# Patient Record
Sex: FEMALE | Race: OTHER | HISPANIC OR LATINO
[De-identification: names, ages, dates, MRNs, and addresses within clinical notes are randomized per-mention and may not be internally consistent; named-entity substitution may affect disease eponyms.]

---

## 2023-04-05 ENCOUNTER — NON-APPOINTMENT (OUTPATIENT)
Age: 63
End: 2023-04-05

## 2023-04-05 ENCOUNTER — APPOINTMENT (OUTPATIENT)
Dept: FAMILY MEDICINE | Facility: CLINIC | Age: 63
End: 2023-04-05
Payer: OTHER GOVERNMENT

## 2023-04-05 VITALS
BODY MASS INDEX: 23.39 KG/M2 | DIASTOLIC BLOOD PRESSURE: 81 MMHG | HEART RATE: 74 BPM | RESPIRATION RATE: 16 BRPM | SYSTOLIC BLOOD PRESSURE: 128 MMHG | WEIGHT: 149 LBS | HEIGHT: 67 IN

## 2023-04-05 DIAGNOSIS — Z11.59 ENCOUNTER FOR SCREENING FOR OTHER VIRAL DISEASES: ICD-10-CM

## 2023-04-05 DIAGNOSIS — Z00.00 ENCOUNTER FOR GENERAL ADULT MEDICAL EXAMINATION W/OUT ABNORMAL FINDINGS: ICD-10-CM

## 2023-04-05 DIAGNOSIS — Z82.49 FAMILY HISTORY OF ISCHEMIC HEART DISEASE AND OTHER DISEASES OF THE CIRCULATORY SYSTEM: ICD-10-CM

## 2023-04-05 DIAGNOSIS — Z11.4 ENCOUNTER FOR SCREENING FOR HUMAN IMMUNODEFICIENCY VIRUS [HIV]: ICD-10-CM

## 2023-04-05 DIAGNOSIS — Z23 ENCOUNTER FOR IMMUNIZATION: ICD-10-CM

## 2023-04-05 DIAGNOSIS — Z12.11 ENCOUNTER FOR SCREENING FOR MALIGNANT NEOPLASM OF COLON: ICD-10-CM

## 2023-04-05 PROCEDURE — 99386 PREV VISIT NEW AGE 40-64: CPT | Mod: 25

## 2023-04-05 PROCEDURE — 90686 IIV4 VACC NO PRSV 0.5 ML IM: CPT

## 2023-04-05 PROCEDURE — 90471 IMMUNIZATION ADMIN: CPT

## 2023-04-05 PROCEDURE — G0008: CPT | Mod: 59

## 2023-04-05 PROCEDURE — 90715 TDAP VACCINE 7 YRS/> IM: CPT

## 2023-04-05 PROCEDURE — 36415 COLL VENOUS BLD VENIPUNCTURE: CPT

## 2023-04-05 RX ORDER — SODIUM BICARBONATE 650 MG/1
650 TABLET ORAL
Refills: 0 | Status: ACTIVE | COMMUNITY

## 2023-04-05 RX ORDER — OMEPRAZOLE 20 MG/1
20 CAPSULE, DELAYED RELEASE ORAL
Refills: 0 | Status: ACTIVE | COMMUNITY

## 2023-04-05 NOTE — HISTORY OF PRESENT ILLNESS
[FreeTextEntry1] : Establish care [de-identified] : Ms. 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 She will start immunotherapy in about 1 month.\par  \par Urology: BEENA Duenas ([facility] [place])\par Heme/onc: ROSA Sethi ([facility] [place])\par Hematologist: ORTIZ Gonzalez

## 2023-04-05 NOTE — HEALTH RISK ASSESSMENT
[No] : In the past 12 months have you used drugs other than those required for medical reasons? No [0] : 2) Feeling down, depressed, or hopeless: Not at all (0) [PHQ-2 Negative - No further assessment needed] : PHQ-2 Negative - No further assessment needed [Patient reported mammogram was normal] : Patient reported mammogram was normal [HIV Test offered] : HIV Test offered [Hepatitis C test offered] : Hepatitis C test offered [Former] : Former [0-4] : 0-4 [> 15 Years] : > 15 Years [[id number]] : 0 [MammogramDate] : 01/17 [PapSmearDate] : 01/21

## 2023-04-05 NOTE — PLAN
[FreeTextEntry1] : \par In regards annual physical exam: \par Advised to use sunscreen \par Influenza vaccine and Tdap vaccine given, tolerated well\par Ordering CBC, CMP, Hep C, HIV as per screening guidelines\par Depression screen: PHQ-2 test done, < 2 as noted above\par AUDIT-C test done, negative as noted above\par Advised to see optometrist once at year and dentist every 6 months\par Colon cancer screen ordered: iFOBT. had colonoscopy recently, will need records\par Referring to OB/Gyn for her PAP smear and mammogram\par \par Return to care: within 1 year for AWE or sooner should the need arise\par Call or return for any questions

## 2023-04-06 LAB
ALBUMIN SERPL ELPH-MCNC: 4.2 G/DL
ALP BLD-CCNC: 83 U/L
ALT SERPL-CCNC: 14 U/L
ANION GAP SERPL CALC-SCNC: 12 MMOL/L
AST SERPL-CCNC: 19 U/L
BASOPHILS # BLD AUTO: 0.02 K/UL
BASOPHILS NFR BLD AUTO: 0.4 %
BILIRUB SERPL-MCNC: 0.3 MG/DL
BUN SERPL-MCNC: 33 MG/DL
CALCIUM SERPL-MCNC: 9.7 MG/DL
CHLORIDE SERPL-SCNC: 104 MMOL/L
CO2 SERPL-SCNC: 22 MMOL/L
CREAT SERPL-MCNC: 0.9 MG/DL
EGFR: 73 ML/MIN/1.73M2
EOSINOPHIL # BLD AUTO: 0.15 K/UL
EOSINOPHIL NFR BLD AUTO: 3.1 %
GLUCOSE SERPL-MCNC: 94 MG/DL
HCT VFR BLD CALC: 33.6 %
HCV AB SER QL: NONREACTIVE
HCV S/CO RATIO: 0.22 S/CO
HGB BLD-MCNC: 10.5 G/DL
HIV1+2 AB SPEC QL IA.RAPID: NONREACTIVE
IMM GRANULOCYTES NFR BLD AUTO: 0.4 %
LYMPHOCYTES # BLD AUTO: 2.18 K/UL
LYMPHOCYTES NFR BLD AUTO: 44.9 %
MAN DIFF?: NORMAL
MCHC RBC-ENTMCNC: 29.7 PG
MCHC RBC-ENTMCNC: 31.3 GM/DL
MCV RBC AUTO: 94.9 FL
MONOCYTES # BLD AUTO: 0.43 K/UL
MONOCYTES NFR BLD AUTO: 8.8 %
NEUTROPHILS # BLD AUTO: 2.06 K/UL
NEUTROPHILS NFR BLD AUTO: 42.4 %
PLATELET # BLD AUTO: 274 K/UL
POTASSIUM SERPL-SCNC: 4.7 MMOL/L
PROT SERPL-MCNC: 7.4 G/DL
RBC # BLD: 3.54 M/UL
RBC # FLD: 16.1 %
SODIUM SERPL-SCNC: 139 MMOL/L
WBC # FLD AUTO: 4.86 K/UL

## 2023-04-20 ENCOUNTER — APPOINTMENT (OUTPATIENT)
Dept: FAMILY MEDICINE | Facility: CLINIC | Age: 63
End: 2023-04-20

## 2023-05-02 RX ORDER — APIXABAN 5 MG/1
5 TABLET, FILM COATED ORAL
Qty: 180 | Refills: 0 | Status: ACTIVE | COMMUNITY
Start: 1900-01-01 | End: 1900-01-01

## 2023-05-18 ENCOUNTER — APPOINTMENT (OUTPATIENT)
Dept: FAMILY MEDICINE | Facility: CLINIC | Age: 63
End: 2023-05-18
Payer: OTHER GOVERNMENT

## 2023-05-18 VITALS
SYSTOLIC BLOOD PRESSURE: 115 MMHG | HEART RATE: 64 BPM | DIASTOLIC BLOOD PRESSURE: 62 MMHG | WEIGHT: 149 LBS | RESPIRATION RATE: 16 BRPM | HEIGHT: 67 IN | BODY MASS INDEX: 23.39 KG/M2

## 2023-05-18 DIAGNOSIS — I10 ESSENTIAL (PRIMARY) HYPERTENSION: ICD-10-CM

## 2023-05-18 DIAGNOSIS — D64.9 ANEMIA, UNSPECIFIED: ICD-10-CM

## 2023-05-18 DIAGNOSIS — Z12.39 ENCOUNTER FOR OTHER SCREENING FOR MALIGNANT NEOPLASM OF BREAST: ICD-10-CM

## 2023-05-18 DIAGNOSIS — E78.5 HYPERLIPIDEMIA, UNSPECIFIED: ICD-10-CM

## 2023-05-18 DIAGNOSIS — R73.09 OTHER ABNORMAL GLUCOSE: ICD-10-CM

## 2023-05-18 PROCEDURE — 36415 COLL VENOUS BLD VENIPUNCTURE: CPT

## 2023-05-18 PROCEDURE — 99214 OFFICE O/P EST MOD 30 MIN: CPT | Mod: 25

## 2023-05-18 RX ORDER — METOPROLOL SUCCINATE 25 MG/1
25 TABLET, EXTENDED RELEASE ORAL
Qty: 90 | Refills: 0 | Status: DISCONTINUED | COMMUNITY
End: 2023-05-18

## 2023-05-18 NOTE — PLAN
[FreeTextEntry1] : \par In regards to hypertension\par Patient's blood pressure in adequate range. \par DIscussed avoid using salt, monitoring his blood pressures at home and bring the log on next visit.\par Not on metoprolol anymore\par Diet controlled\par On Sodium Bicarb by her urologist Dr Duenas\par Has her CMP monitored at [facility] [place], would like to defer it at this time\par \par In regards to hyperlipidemia and history of pre-DM\par Lifestyle modifications discussed\par Checking lipid panel\par Check an A1C. Report history of elevated glucoses at [facility] [place] [facility], will need records\par \par Anemia- monitored ad [facility]\par ASymptomatic\par \par Return to care: within 3 months for HTN or sooner should the need arise\par Call or return for any questions

## 2023-05-18 NOTE — HISTORY OF PRESENT ILLNESS
[FreeTextEntry1] : Follow up [de-identified] : Ms. FELIX PRICE is a pleasant 63 year old female with PMH of HTN, bladder cancer (jun/2022) s/p chemo last nov/2022, s/p neobladder 02/03/2023 never on radiation, HLD who comes in to the office for lab check. She would like her lipids and an A1C checked. Reports history of insulin resistance. No other complaints\par Has not been taking her metoprolol\par Her anemia is asymptomatic and monitored at [facility]\par \par I advised her to take iron\par She see urology who sends her her Sodium Bicarb \par  \par Urology: BEENA Duenas ([facility])\par Heme/onc: ROSA Sethi ([facility])\par Hematologist: ORTIZ Gonzalez

## 2023-05-19 LAB
CHOLEST SERPL-MCNC: 206 MG/DL
ESTIMATED AVERAGE GLUCOSE: 97 MG/DL
HBA1C MFR BLD HPLC: 5 %
HDLC SERPL-MCNC: 88 MG/DL
LDLC SERPL CALC-MCNC: 100 MG/DL
NONHDLC SERPL-MCNC: 117 MG/DL
TRIGL SERPL-MCNC: 88 MG/DL

## 2023-06-26 ENCOUNTER — APPOINTMENT (OUTPATIENT)
Dept: FAMILY MEDICINE | Facility: CLINIC | Age: 63
End: 2023-06-26

## 2023-07-06 ENCOUNTER — TRANSCRIPTION ENCOUNTER (OUTPATIENT)
Age: 63
End: 2023-07-06

## 2023-09-18 ENCOUNTER — NON-APPOINTMENT (OUTPATIENT)
Age: 63
End: 2023-09-18

## 2023-09-20 DIAGNOSIS — R92.8 OTHER ABNORMAL AND INCONCLUSIVE FINDINGS ON DIAGNOSTIC IMAGING OF BREAST: ICD-10-CM

## 2023-10-06 ENCOUNTER — NON-APPOINTMENT (OUTPATIENT)
Age: 63
End: 2023-10-06

## 2023-10-08 ENCOUNTER — NON-APPOINTMENT (OUTPATIENT)
Age: 63
End: 2023-10-08

## 2023-11-20 ENCOUNTER — INPATIENT (INPATIENT)
Facility: HOSPITAL | Age: 63
LOS: 1 days | Discharge: HOME | DRG: 393 | End: 2023-11-22
Attending: HOSPITALIST | Admitting: STUDENT IN AN ORGANIZED HEALTH CARE EDUCATION/TRAINING PROGRAM
Payer: OTHER GOVERNMENT

## 2023-11-20 VITALS
WEIGHT: 185.41 LBS | HEART RATE: 84 BPM | OXYGEN SATURATION: 98 % | SYSTOLIC BLOOD PRESSURE: 116 MMHG | DIASTOLIC BLOOD PRESSURE: 76 MMHG | TEMPERATURE: 98 F | RESPIRATION RATE: 17 BRPM

## 2023-11-20 LAB
ALBUMIN SERPL ELPH-MCNC: 4 G/DL — SIGNIFICANT CHANGE UP (ref 3.3–5.2)
ALBUMIN SERPL ELPH-MCNC: 4 G/DL — SIGNIFICANT CHANGE UP (ref 3.3–5.2)
ALP SERPL-CCNC: 53 U/L — SIGNIFICANT CHANGE UP (ref 40–120)
ALP SERPL-CCNC: 53 U/L — SIGNIFICANT CHANGE UP (ref 40–120)
ALT FLD-CCNC: 16 U/L — SIGNIFICANT CHANGE UP
ALT FLD-CCNC: 16 U/L — SIGNIFICANT CHANGE UP
ANION GAP SERPL CALC-SCNC: 8 MMOL/L — SIGNIFICANT CHANGE UP (ref 5–17)
ANION GAP SERPL CALC-SCNC: 8 MMOL/L — SIGNIFICANT CHANGE UP (ref 5–17)
AST SERPL-CCNC: 19 U/L — SIGNIFICANT CHANGE UP
AST SERPL-CCNC: 19 U/L — SIGNIFICANT CHANGE UP
BASOPHILS # BLD AUTO: 0.02 K/UL — SIGNIFICANT CHANGE UP (ref 0–0.2)
BASOPHILS # BLD AUTO: 0.02 K/UL — SIGNIFICANT CHANGE UP (ref 0–0.2)
BASOPHILS NFR BLD AUTO: 0.6 % — SIGNIFICANT CHANGE UP (ref 0–2)
BASOPHILS NFR BLD AUTO: 0.6 % — SIGNIFICANT CHANGE UP (ref 0–2)
BILIRUB SERPL-MCNC: <0.2 MG/DL — LOW (ref 0.4–2)
BILIRUB SERPL-MCNC: <0.2 MG/DL — LOW (ref 0.4–2)
BUN SERPL-MCNC: 35.8 MG/DL — HIGH (ref 8–20)
BUN SERPL-MCNC: 35.8 MG/DL — HIGH (ref 8–20)
CALCIUM SERPL-MCNC: 9.4 MG/DL — SIGNIFICANT CHANGE UP (ref 8.4–10.5)
CALCIUM SERPL-MCNC: 9.4 MG/DL — SIGNIFICANT CHANGE UP (ref 8.4–10.5)
CHLORIDE SERPL-SCNC: 108 MMOL/L — SIGNIFICANT CHANGE UP (ref 96–108)
CHLORIDE SERPL-SCNC: 108 MMOL/L — SIGNIFICANT CHANGE UP (ref 96–108)
CO2 SERPL-SCNC: 28 MMOL/L — SIGNIFICANT CHANGE UP (ref 22–29)
CO2 SERPL-SCNC: 28 MMOL/L — SIGNIFICANT CHANGE UP (ref 22–29)
CREAT SERPL-MCNC: 1.07 MG/DL — SIGNIFICANT CHANGE UP (ref 0.5–1.3)
CREAT SERPL-MCNC: 1.07 MG/DL — SIGNIFICANT CHANGE UP (ref 0.5–1.3)
EGFR: 59 ML/MIN/1.73M2 — LOW
EGFR: 59 ML/MIN/1.73M2 — LOW
EOSINOPHIL # BLD AUTO: 0.29 K/UL — SIGNIFICANT CHANGE UP (ref 0–0.5)
EOSINOPHIL # BLD AUTO: 0.29 K/UL — SIGNIFICANT CHANGE UP (ref 0–0.5)
EOSINOPHIL NFR BLD AUTO: 8 % — HIGH (ref 0–6)
EOSINOPHIL NFR BLD AUTO: 8 % — HIGH (ref 0–6)
GLUCOSE SERPL-MCNC: 103 MG/DL — HIGH (ref 70–99)
GLUCOSE SERPL-MCNC: 103 MG/DL — HIGH (ref 70–99)
HCT VFR BLD CALC: 31.3 % — LOW (ref 34.5–45)
HCT VFR BLD CALC: 31.3 % — LOW (ref 34.5–45)
HGB BLD-MCNC: 10.4 G/DL — LOW (ref 11.5–15.5)
HGB BLD-MCNC: 10.4 G/DL — LOW (ref 11.5–15.5)
IMM GRANULOCYTES NFR BLD AUTO: 0.3 % — SIGNIFICANT CHANGE UP (ref 0–0.9)
IMM GRANULOCYTES NFR BLD AUTO: 0.3 % — SIGNIFICANT CHANGE UP (ref 0–0.9)
INR BLD: 0.94 RATIO — SIGNIFICANT CHANGE UP (ref 0.85–1.18)
INR BLD: 0.94 RATIO — SIGNIFICANT CHANGE UP (ref 0.85–1.18)
LIDOCAIN IGE QN: 53 U/L — HIGH (ref 22–51)
LIDOCAIN IGE QN: 53 U/L — HIGH (ref 22–51)
LYMPHOCYTES # BLD AUTO: 1 K/UL — SIGNIFICANT CHANGE UP (ref 1–3.3)
LYMPHOCYTES # BLD AUTO: 1 K/UL — SIGNIFICANT CHANGE UP (ref 1–3.3)
LYMPHOCYTES # BLD AUTO: 27.5 % — SIGNIFICANT CHANGE UP (ref 13–44)
LYMPHOCYTES # BLD AUTO: 27.5 % — SIGNIFICANT CHANGE UP (ref 13–44)
MCHC RBC-ENTMCNC: 30.7 PG — SIGNIFICANT CHANGE UP (ref 27–34)
MCHC RBC-ENTMCNC: 30.7 PG — SIGNIFICANT CHANGE UP (ref 27–34)
MCHC RBC-ENTMCNC: 33.2 GM/DL — SIGNIFICANT CHANGE UP (ref 32–36)
MCHC RBC-ENTMCNC: 33.2 GM/DL — SIGNIFICANT CHANGE UP (ref 32–36)
MCV RBC AUTO: 92.3 FL — SIGNIFICANT CHANGE UP (ref 80–100)
MCV RBC AUTO: 92.3 FL — SIGNIFICANT CHANGE UP (ref 80–100)
MONOCYTES # BLD AUTO: 0.39 K/UL — SIGNIFICANT CHANGE UP (ref 0–0.9)
MONOCYTES # BLD AUTO: 0.39 K/UL — SIGNIFICANT CHANGE UP (ref 0–0.9)
MONOCYTES NFR BLD AUTO: 10.7 % — SIGNIFICANT CHANGE UP (ref 2–14)
MONOCYTES NFR BLD AUTO: 10.7 % — SIGNIFICANT CHANGE UP (ref 2–14)
NEUTROPHILS # BLD AUTO: 1.92 K/UL — SIGNIFICANT CHANGE UP (ref 1.8–7.4)
NEUTROPHILS # BLD AUTO: 1.92 K/UL — SIGNIFICANT CHANGE UP (ref 1.8–7.4)
NEUTROPHILS NFR BLD AUTO: 52.9 % — SIGNIFICANT CHANGE UP (ref 43–77)
NEUTROPHILS NFR BLD AUTO: 52.9 % — SIGNIFICANT CHANGE UP (ref 43–77)
PLATELET # BLD AUTO: 144 K/UL — LOW (ref 150–400)
PLATELET # BLD AUTO: 144 K/UL — LOW (ref 150–400)
POTASSIUM SERPL-MCNC: 4.6 MMOL/L — SIGNIFICANT CHANGE UP (ref 3.5–5.3)
POTASSIUM SERPL-MCNC: 4.6 MMOL/L — SIGNIFICANT CHANGE UP (ref 3.5–5.3)
POTASSIUM SERPL-SCNC: 4.6 MMOL/L — SIGNIFICANT CHANGE UP (ref 3.5–5.3)
POTASSIUM SERPL-SCNC: 4.6 MMOL/L — SIGNIFICANT CHANGE UP (ref 3.5–5.3)
PROT SERPL-MCNC: 6.1 G/DL — LOW (ref 6.6–8.7)
PROT SERPL-MCNC: 6.1 G/DL — LOW (ref 6.6–8.7)
PROTHROM AB SERPL-ACNC: 10.5 SEC — SIGNIFICANT CHANGE UP (ref 9.5–13)
PROTHROM AB SERPL-ACNC: 10.5 SEC — SIGNIFICANT CHANGE UP (ref 9.5–13)
RBC # BLD: 3.39 M/UL — LOW (ref 3.8–5.2)
RBC # BLD: 3.39 M/UL — LOW (ref 3.8–5.2)
RBC # FLD: 12.4 % — SIGNIFICANT CHANGE UP (ref 10.3–14.5)
RBC # FLD: 12.4 % — SIGNIFICANT CHANGE UP (ref 10.3–14.5)
SODIUM SERPL-SCNC: 144 MMOL/L — SIGNIFICANT CHANGE UP (ref 135–145)
SODIUM SERPL-SCNC: 144 MMOL/L — SIGNIFICANT CHANGE UP (ref 135–145)
WBC # BLD: 3.63 K/UL — LOW (ref 3.8–10.5)
WBC # BLD: 3.63 K/UL — LOW (ref 3.8–10.5)
WBC # FLD AUTO: 3.63 K/UL — LOW (ref 3.8–10.5)
WBC # FLD AUTO: 3.63 K/UL — LOW (ref 3.8–10.5)

## 2023-11-20 PROCEDURE — 99285 EMERGENCY DEPT VISIT HI MDM: CPT

## 2023-11-20 NOTE — ED PROVIDER NOTE - ATTENDING CONTRIBUTION TO CARE
63y female w/ pmh of bladder cancer, neobladder procedure in 2/23 and subsequent PE now on Eliquis sent by her hematologist for bright red blood per rectum. Patient states she has noticed some bright red blood when wiping after bowel movements over the last couple of days.  However she states over the last 24 hours she notices significant increase in amount of bleeding when having bowel movements; she has had blood with wiping, blood in the bowl, and blood mixed with stool.  Patient however denies any additional symptoms at this time      I personally saw the patient with the resident, and completed the key components of the history and physical exam. I then discussed the management plan with the resident.

## 2023-11-20 NOTE — ED ADULT TRIAGE NOTE - GLASGOW COMA SCALE: EYE OPENING, MLM
(E4) spontaneous Detail Level: Detailed Introduction Text (Please End With A Colon): The following procedure was deferred:

## 2023-11-20 NOTE — ED PROVIDER NOTE - NS ED ROS FT
General: Denies fever, chills  HEENT: Denies sore throat  Neck: Denies neck pain  Resp: Denies coughing, SOB  Cardiovascular: Denies CP, palpitations, LE edema  GI: Denies nausea, vomiting, abdominal pain, diarrhea, constipation. rectal bleeding  : Denies dysuria, hematuria  MSK: Denies back pain  Neuro: Denies HA, dizziness, numbness, weakness  Skin: Denies rashes.

## 2023-11-20 NOTE — ED PROVIDER NOTE - CLINICAL SUMMARY MEDICAL DECISION MAKING FREE TEXT BOX
63y female w/ pmh of bladder cancer, bladder reconstruction, and PE evaluated for bright red blood per rectum. 63y female w/ pmh of bladder cancer, bladder reconstruction, and PE evaluated for bright red blood per rectum. Patient well appearing on exam, vitals stable, CBC from earlier today shows hemoglobin of 11.2. 63y female w/ pmh of bladder cancer, bladder reconstruction, and PE evaluated for bright red blood per rectum. Patient well appearing on exam, vitals stable, CBC from earlier today shows hemoglobin of 11.2. repeat CBC in the ED shows Hgb of 10.4, almost a unit drop in one day. Patient continues to have bloody BMs while in the ED. Dr. Rdoríguez to admit for colonoscopy and further monitoring.

## 2023-11-20 NOTE — ED ADULT TRIAGE NOTE - CHIEF COMPLAINT QUOTE
pt presents c/o bright red rectal bleeding starting 1am, [place] [facility] on eliquis, denies dizziness/lightheadedness, abd pain

## 2023-11-20 NOTE — ED PROVIDER NOTE - PROGRESS NOTE DETAILS
Patient has had several bowel movements in the ER and states that she continues to have a small amount of bleeding but it continues to be more significant than it has in the past;   She states blood seems to be mixed into the bowl when she has bowel movements.  Patient denies clots or profuse bleeding.

## 2023-11-20 NOTE — ED PROVIDER NOTE - OBJECTIVE STATEMENT
63y female w/ pmh of bladder cancer, neobladder procedure in 2/23 and subsequent PE now on Eliquis sent by her hematologist for bright red blood per rectum. She states she first noticed some spots on the toilet paper on 11/9/23, today she noticed a moderate amount of bright red blood in the toilet bowl. She denies any abdominal or rectal pain, fever, N/V/D, vaginal bleeding, blood in her urine, fatigue, SOB, lightheadedness, palpitations, chest pain. She had a colonoscopy in January of this year which showed grade I internal hemorrhoids but was otherwise clear. She is on immunotherapy for her cancer. No other complaints noted.

## 2023-11-21 DIAGNOSIS — Z90.6 ACQUIRED ABSENCE OF OTHER PARTS OF URINARY TRACT: Chronic | ICD-10-CM

## 2023-11-21 DIAGNOSIS — K92.2 GASTROINTESTINAL HEMORRHAGE, UNSPECIFIED: ICD-10-CM

## 2023-11-21 DIAGNOSIS — Z90.710 ACQUIRED ABSENCE OF BOTH CERVIX AND UTERUS: Chronic | ICD-10-CM

## 2023-11-21 LAB
ANION GAP SERPL CALC-SCNC: 10 MMOL/L — SIGNIFICANT CHANGE UP (ref 5–17)
ANION GAP SERPL CALC-SCNC: 10 MMOL/L — SIGNIFICANT CHANGE UP (ref 5–17)
BASOPHILS # BLD AUTO: 0.01 K/UL — SIGNIFICANT CHANGE UP (ref 0–0.2)
BASOPHILS # BLD AUTO: 0.01 K/UL — SIGNIFICANT CHANGE UP (ref 0–0.2)
BASOPHILS NFR BLD AUTO: 0.3 % — SIGNIFICANT CHANGE UP (ref 0–2)
BASOPHILS NFR BLD AUTO: 0.3 % — SIGNIFICANT CHANGE UP (ref 0–2)
BLD GP AB SCN SERPL QL: SIGNIFICANT CHANGE UP
BLD GP AB SCN SERPL QL: SIGNIFICANT CHANGE UP
BUN SERPL-MCNC: 32.9 MG/DL — HIGH (ref 8–20)
BUN SERPL-MCNC: 32.9 MG/DL — HIGH (ref 8–20)
CALCIUM SERPL-MCNC: 8.9 MG/DL — SIGNIFICANT CHANGE UP (ref 8.4–10.5)
CALCIUM SERPL-MCNC: 8.9 MG/DL — SIGNIFICANT CHANGE UP (ref 8.4–10.5)
CHLORIDE SERPL-SCNC: 107 MMOL/L — SIGNIFICANT CHANGE UP (ref 96–108)
CHLORIDE SERPL-SCNC: 107 MMOL/L — SIGNIFICANT CHANGE UP (ref 96–108)
CO2 SERPL-SCNC: 25 MMOL/L — SIGNIFICANT CHANGE UP (ref 22–29)
CO2 SERPL-SCNC: 25 MMOL/L — SIGNIFICANT CHANGE UP (ref 22–29)
CREAT SERPL-MCNC: 0.8 MG/DL — SIGNIFICANT CHANGE UP (ref 0.5–1.3)
CREAT SERPL-MCNC: 0.8 MG/DL — SIGNIFICANT CHANGE UP (ref 0.5–1.3)
EGFR: 83 ML/MIN/1.73M2 — SIGNIFICANT CHANGE UP
EGFR: 83 ML/MIN/1.73M2 — SIGNIFICANT CHANGE UP
EOSINOPHIL # BLD AUTO: 0.25 K/UL — SIGNIFICANT CHANGE UP (ref 0–0.5)
EOSINOPHIL # BLD AUTO: 0.25 K/UL — SIGNIFICANT CHANGE UP (ref 0–0.5)
EOSINOPHIL NFR BLD AUTO: 7.4 % — HIGH (ref 0–6)
EOSINOPHIL NFR BLD AUTO: 7.4 % — HIGH (ref 0–6)
GLUCOSE SERPL-MCNC: 105 MG/DL — HIGH (ref 70–99)
GLUCOSE SERPL-MCNC: 105 MG/DL — HIGH (ref 70–99)
HCT VFR BLD CALC: 31.3 % — LOW (ref 34.5–45)
HCT VFR BLD CALC: 31.3 % — LOW (ref 34.5–45)
HGB BLD-MCNC: 10.3 G/DL — LOW (ref 11.5–15.5)
HGB BLD-MCNC: 10.3 G/DL — LOW (ref 11.5–15.5)
IMM GRANULOCYTES NFR BLD AUTO: 0 % — SIGNIFICANT CHANGE UP (ref 0–0.9)
IMM GRANULOCYTES NFR BLD AUTO: 0 % — SIGNIFICANT CHANGE UP (ref 0–0.9)
LYMPHOCYTES # BLD AUTO: 0.88 K/UL — LOW (ref 1–3.3)
LYMPHOCYTES # BLD AUTO: 0.88 K/UL — LOW (ref 1–3.3)
LYMPHOCYTES # BLD AUTO: 26.2 % — SIGNIFICANT CHANGE UP (ref 13–44)
LYMPHOCYTES # BLD AUTO: 26.2 % — SIGNIFICANT CHANGE UP (ref 13–44)
MCHC RBC-ENTMCNC: 30.5 PG — SIGNIFICANT CHANGE UP (ref 27–34)
MCHC RBC-ENTMCNC: 30.5 PG — SIGNIFICANT CHANGE UP (ref 27–34)
MCHC RBC-ENTMCNC: 32.9 GM/DL — SIGNIFICANT CHANGE UP (ref 32–36)
MCHC RBC-ENTMCNC: 32.9 GM/DL — SIGNIFICANT CHANGE UP (ref 32–36)
MCV RBC AUTO: 92.6 FL — SIGNIFICANT CHANGE UP (ref 80–100)
MCV RBC AUTO: 92.6 FL — SIGNIFICANT CHANGE UP (ref 80–100)
MONOCYTES # BLD AUTO: 0.39 K/UL — SIGNIFICANT CHANGE UP (ref 0–0.9)
MONOCYTES # BLD AUTO: 0.39 K/UL — SIGNIFICANT CHANGE UP (ref 0–0.9)
MONOCYTES NFR BLD AUTO: 11.6 % — SIGNIFICANT CHANGE UP (ref 2–14)
MONOCYTES NFR BLD AUTO: 11.6 % — SIGNIFICANT CHANGE UP (ref 2–14)
NEUTROPHILS # BLD AUTO: 1.83 K/UL — SIGNIFICANT CHANGE UP (ref 1.8–7.4)
NEUTROPHILS # BLD AUTO: 1.83 K/UL — SIGNIFICANT CHANGE UP (ref 1.8–7.4)
NEUTROPHILS NFR BLD AUTO: 54.5 % — SIGNIFICANT CHANGE UP (ref 43–77)
NEUTROPHILS NFR BLD AUTO: 54.5 % — SIGNIFICANT CHANGE UP (ref 43–77)
PLATELET # BLD AUTO: 129 K/UL — LOW (ref 150–400)
PLATELET # BLD AUTO: 129 K/UL — LOW (ref 150–400)
POTASSIUM SERPL-MCNC: 4 MMOL/L — SIGNIFICANT CHANGE UP (ref 3.5–5.3)
POTASSIUM SERPL-MCNC: 4 MMOL/L — SIGNIFICANT CHANGE UP (ref 3.5–5.3)
POTASSIUM SERPL-SCNC: 4 MMOL/L — SIGNIFICANT CHANGE UP (ref 3.5–5.3)
POTASSIUM SERPL-SCNC: 4 MMOL/L — SIGNIFICANT CHANGE UP (ref 3.5–5.3)
RBC # BLD: 3.38 M/UL — LOW (ref 3.8–5.2)
RBC # BLD: 3.38 M/UL — LOW (ref 3.8–5.2)
RBC # FLD: 12.4 % — SIGNIFICANT CHANGE UP (ref 10.3–14.5)
RBC # FLD: 12.4 % — SIGNIFICANT CHANGE UP (ref 10.3–14.5)
SODIUM SERPL-SCNC: 142 MMOL/L — SIGNIFICANT CHANGE UP (ref 135–145)
SODIUM SERPL-SCNC: 142 MMOL/L — SIGNIFICANT CHANGE UP (ref 135–145)
WBC # BLD: 3.36 K/UL — LOW (ref 3.8–10.5)
WBC # BLD: 3.36 K/UL — LOW (ref 3.8–10.5)
WBC # FLD AUTO: 3.36 K/UL — LOW (ref 3.8–10.5)
WBC # FLD AUTO: 3.36 K/UL — LOW (ref 3.8–10.5)

## 2023-11-21 PROCEDURE — 99497 ADVNCD CARE PLAN 30 MIN: CPT | Mod: 25

## 2023-11-21 PROCEDURE — 99223 1ST HOSP IP/OBS HIGH 75: CPT

## 2023-11-21 PROCEDURE — 99222 1ST HOSP IP/OBS MODERATE 55: CPT | Mod: GC

## 2023-11-21 RX ORDER — ACETAMINOPHEN 500 MG
650 TABLET ORAL EVERY 6 HOURS
Refills: 0 | Status: DISCONTINUED | OUTPATIENT
Start: 2023-11-21 | End: 2023-11-22

## 2023-11-21 RX ORDER — APIXABAN 2.5 MG/1
1 TABLET, FILM COATED ORAL
Refills: 0 | DISCHARGE

## 2023-11-21 RX ORDER — ONDANSETRON 8 MG/1
4 TABLET, FILM COATED ORAL EVERY 8 HOURS
Refills: 0 | Status: DISCONTINUED | OUTPATIENT
Start: 2023-11-21 | End: 2023-11-22

## 2023-11-21 RX ORDER — NIVOLUMAB 10 MG/ML
240 INJECTION INTRAVENOUS
Refills: 0 | DISCHARGE

## 2023-11-21 RX ORDER — POLYETHYLENE GLYCOL 3350 17 G/17G
17 POWDER, FOR SOLUTION ORAL DAILY
Refills: 0 | Status: DISCONTINUED | OUTPATIENT
Start: 2023-11-21 | End: 2023-11-22

## 2023-11-21 RX ORDER — APIXABAN 2.5 MG/1
5 TABLET, FILM COATED ORAL
Refills: 0 | Status: DISCONTINUED | OUTPATIENT
Start: 2023-11-21 | End: 2023-11-22

## 2023-11-21 RX ORDER — DIPHENHYDRAMINE HCL 50 MG
25 CAPSULE ORAL ONCE
Refills: 0 | Status: COMPLETED | OUTPATIENT
Start: 2023-11-21 | End: 2023-11-21

## 2023-11-21 RX ORDER — PANTOPRAZOLE SODIUM 20 MG/1
40 TABLET, DELAYED RELEASE ORAL
Refills: 0 | Status: DISCONTINUED | OUTPATIENT
Start: 2023-11-21 | End: 2023-11-22

## 2023-11-21 RX ORDER — PANTOPRAZOLE SODIUM 20 MG/1
40 TABLET, DELAYED RELEASE ORAL
Refills: 0 | Status: DISCONTINUED | OUTPATIENT
Start: 2023-11-21 | End: 2023-11-21

## 2023-11-21 RX ORDER — HYDROCORTISONE 1 %
1 OINTMENT (GRAM) TOPICAL
Refills: 0 | Status: DISCONTINUED | OUTPATIENT
Start: 2023-11-21 | End: 2023-11-22

## 2023-11-21 RX ORDER — LANOLIN ALCOHOL/MO/W.PET/CERES
3 CREAM (GRAM) TOPICAL AT BEDTIME
Refills: 0 | Status: DISCONTINUED | OUTPATIENT
Start: 2023-11-21 | End: 2023-11-22

## 2023-11-21 RX ORDER — SENNA PLUS 8.6 MG/1
2 TABLET ORAL AT BEDTIME
Refills: 0 | Status: DISCONTINUED | OUTPATIENT
Start: 2023-11-21 | End: 2023-11-22

## 2023-11-21 RX ADMIN — Medication 25 MILLIGRAM(S): at 18:04

## 2023-11-21 RX ADMIN — PANTOPRAZOLE SODIUM 40 MILLIGRAM(S): 20 TABLET, DELAYED RELEASE ORAL at 18:27

## 2023-11-21 RX ADMIN — Medication 25 MILLIGRAM(S): at 02:47

## 2023-11-21 RX ADMIN — PANTOPRAZOLE SODIUM 40 MILLIGRAM(S): 20 TABLET, DELAYED RELEASE ORAL at 02:04

## 2023-11-21 NOTE — CONSULT NOTE ADULT - ATTENDING COMMENTS
Ms. Velez is a 63 year old woman with significant past medical history of bladder cancer s/p resection with neobladder formation further complicated by pulmonary embolism on Apixaban who presented to the ED with multiple episodes of bright red blood per rectum for which GI consulted. She recently underwent Colonoscopy 1/2023 (at [facility] [place]) noted to have internal hemorrhoids. She admits that due to difficulty with micturition she spends long periods of time (upwards of 30-40 minutes) on the toilet and increased intra-abdominal pressure which likely has exacerbated her hemorrhoids. Discussed with patient at bedside regarding avoidance however may be difficult given concerns for inability to adequate void. Overall hemoglobin in ED remains stable (10.3 / 10.4), no further episodes of bleeding. Majority of lower GI bleeding is self limiting. Rectal examination with evidence of brown colored stool rectal vault. Imaging and labs reviewed.     Internal hemorrhoids   Intermittent bright red blood per rectum - resolved     Plan:   - No urgent or emergent endoscopic intervention planned at this time.   - Encouraged avoidance of prolonged toilet use.   - Recommended Miralax / stool softeners.   - Can use Anusol PRN.   - Would follow up outpatient with her GI for further management.   Please do not hesitate to contact GI for further questions or concerns not previously addressed.

## 2023-11-21 NOTE — H&P ADULT - ASSESSMENT
62 yo female with pmhx Bladder CA s/p cystectomy/neobladder procedure with subsequent PE on Eliquis presenting to the ED with complaint of BRBPR.     Acute Blood Loss Anemia  -Admit to medicine  -Hgb stable 11.2 -> 10.4  -Start Protonix 40 mg IV BID  -Keep NPO pending GI evaluation  -Hold Eliquis   -Monitor CBC Q6  -Transfuse for Hgb < 7.0; patient consented and type and screen done    Hx of PE  -Hold Eliquis for now    Bladder Cancer  -On immunotherapy Q1 month    DVTppx: SCDs, hold chemical DVTppx due to GI bleed  GOC: 62 yo female with pmhx Bladder CA s/p cystectomy/neobladder procedure with subsequent PE on Eliquis presenting to the ED with complaint of BRBPR.     Acute Blood Loss Anemia  -Admit to medicine  -Hgb stable 11.2 -> 10.4  -Start Protonix 40 mg IV BID  -Keep NPO pending GI evaluation  -Hold Eliquis   -Monitor CBC Q6  -Transfuse for Hgb < 7.0; patient consented and type and screen done    Hx of PE  -Hold Eliquis for now    Bladder Cancer  -On immunotherapy Q1 month    DVTppx: SCDs, hold chemical DVTppx due to GI bleed  GOC:  Full Code

## 2023-11-21 NOTE — PROVIDER CONTACT NOTE (OTHER) - REASON
Pt c/o itching to buttocks, pt has rash from immunotherapy treatment requesting benadryl 2/2 itchiness

## 2023-11-21 NOTE — ED ADULT NURSE NOTE - NSFALLUNIVINTERV_ED_ALL_ED
Bed/Stretcher in lowest position, wheels locked, appropriate side rails in place/Call bell, personal items and telephone in reach/Instruct patient to call for assistance before getting out of bed/chair/stretcher/Non-slip footwear applied when patient is off stretcher/[place] to call system/Physically safe environment - no spills, clutter or unnecessary equipment/Purposeful proactive rounding/Room/bathroom lighting operational, light cord in reach

## 2023-11-21 NOTE — H&P ADULT - NSHPPHYSICALEXAM_GEN_ALL_CORE
Vital Signs Last 24 Hrs  T(C): 36.5 (20 Nov 2023 23:52), Max: 36.8 (20 Nov 2023 20:22)  T(F): 97.7 (20 Nov 2023 23:52), Max: 98.2 (20 Nov 2023 20:22)  HR: 73 (20 Nov 2023 23:52) (73 - 84)  BP: 101/67 (20 Nov 2023 23:52) (101/67 - 116/76)  BP(mean): --  RR: 17 (20 Nov 2023 23:52) (17 - 17)  SpO2: 99% (20 Nov 2023 23:52) (98% - 99%)    Parameters below as of 20 Nov 2023 23:52  Patient On (Oxygen Delivery Method): room air        General: Age-appearing, in no acute distress  Head: Normocephalic, atraumatic  ENMT: EOMI, neck supple  Cardiovascular: +S1, S2; Regular rate and rhythm, no murmurs, rubs, gallops  Respiratory: CTA BL, no wheezes, rales, rhonchi  Gastrointestinal: Abdomen soft, non-tender, +BS in all 4 quadrants  Extremities: No clubbing, cyanosis, or edema  Vascular: 2+ pulses, cap refill < 2 seconds  Neuro: Non-focal, AAOx4, sensation intact BL  Musculoskeletal: Normal tone, no deformities  Skin: Warm, dry; no acute rash seen  Psych: Appropriate, cooperative Vital Signs Last 24 Hrs  T(C): 36.5 (20 Nov 2023 23:52), Max: 36.8 (20 Nov 2023 20:22)  T(F): 97.7 (20 Nov 2023 23:52), Max: 98.2 (20 Nov 2023 20:22)  HR: 73 (20 Nov 2023 23:52) (73 - 84)  BP: 101/67 (20 Nov 2023 23:52) (101/67 - 116/76)  BP(mean): --  RR: 17 (20 Nov 2023 23:52) (17 - 17)  SpO2: 99% (20 Nov 2023 23:52) (98% - 99%)    Parameters below as of 20 Nov 2023 23:52  Patient On (Oxygen Delivery Method): room air    General: Age-appearing, in no acute distress  Head: Normocephalic, atraumatic  ENMT: EOMI, neck supple  Cardiovascular: +S1, S2; Regular rate and rhythm, no murmurs, rubs, gallops  Respiratory: CTA BL, no wheezes, rales, rhonchi  Gastrointestinal: Abdomen soft, non-tender, +BS in all 4 quadrants  Extremities: No clubbing, cyanosis, or edema  Vascular: 2+ pulses, cap refill < 2 seconds  Neuro: Non-focal, AAOx4, sensation intact BL  Musculoskeletal: Normal tone, no deformities  Skin: Warm, dry; no acute rash seen  Psych: Appropriate, cooperative

## 2023-11-21 NOTE — H&P ADULT - TIME BILLING
Time spent on patient encounter including emergency department chart review, history taking and physical exam, developing patient plan and tailoring H&P, as well as discussion with patient, family, RN, and other providers involved in patient's care.

## 2023-11-21 NOTE — CONSULT NOTE ADULT - ASSESSMENT
64 yo female with pmhx Bladder CA s/p cystectomy/neobladder procedure (on opdivo q4 weeks) with subsequent PE on Eliquis presenting to the ED with CC of BRBPR. Colonoscopy performed 1/23 at [facility] [place] with just hemorrhoids. Hb stable at 10.3 Brown stool in rectal vault. BRBPR likely in setting of hemorrhoids    Hemorrhoidal bleed:  -No indication for endoscopic intervention at this time as per GI  -follow up GI recs, recommend Anusok HC suppository or ointment   - monitor Hb (latest Hb 10.3)    Hx of bladder cancer:   - on Opdivo on outpt basis, to be held during inpatient stay  - follows up with [facility] and will update daily  - has groin itching rash, has outpatient appointment with dermatology, to keep appointment with derm upon d/c    Will follow 64 yo female with pmhx Bladder CA s/p cystectomy/neobladder procedure (on opdivo q4 weeks) with subsequent PE on Eliquis presenting to the ED with CC of BRBPR. [place] [facility] [place]. Hb stable at 10.3 Brown stool in rectal vault. BRBPR likely in setting of hemorrhoids    Hemorrhoidal bleed:  -No indication for endoscopic intervention at this time as per GI  -follow up GI recs, recommend Anusok HC suppository or ointment   - monitor Hb (latest Hb 10.3)    Hx of bladder cancer:   - on Opdivo on outpt basis, to be held during inpatient stay  - [place] [facility] [place] will [place] daily  - has groin itching rash, has outpatient appointment with dermatology, to keep appointment with derm upon d/c.     History of PE:  - was diagnosed with PE in postsurgical setting in Feb 2023 and has been on ELiquis since then  - if no active bleeding with stable Hb and if cleared from GI standpoint, Eliquis can be resumed with close monitoring of CBC    Will follow n/a

## 2023-11-21 NOTE — H&P ADULT - NSICDXPASTSURGICALHX_GEN_ALL_CORE_FT
PAST SURGICAL HISTORY:  History of total cystectomy      PAST SURGICAL HISTORY:  History of total cystectomy     S/P hysterectomy with oophorectomy

## 2023-11-21 NOTE — H&P ADULT - NSHPLABSRESULTS_GEN_ALL_CORE
10.4   3.63  )-----------( 144      ( 20 Nov 2023 23:05 )             31.3     20 Nov 2023 23:05    144    |  108    |  35.8   ----------------------------<  103    4.6     |  28.0   |  1.07     Ca    9.4        20 Nov 2023 23:05    TPro  6.1    /  Alb  4.0    /  TBili  <0.2   /  DBili  x      /  AST  19     /  ALT  16     /  AlkPhos  53     20 Nov 2023 23:05    PT/INR - ( 20 Nov 2023 23:05 )   PT: 10.5 sec;   INR: 0.94 ratio           CAPILLARY BLOOD GLUCOSE        LIVER FUNCTIONS - ( 20 Nov 2023 23:05 )  Alb: 4.0 g/dL / Pro: 6.1 g/dL / ALK PHOS: 53 U/L / ALT: 16 U/L / AST: 19 U/L / GGT: x           Urinalysis Basic - ( 20 Nov 2023 23:05 )    Color: x / Appearance: x / SG: x / pH: x  Gluc: 103 mg/dL / Ketone: x  / Bili: x / Urobili: x   Blood: x / Protein: x / Nitrite: x   Leuk Esterase: x / RBC: x / WBC x   Sq Epi: x / Non Sq Epi: x / Bacteria: x

## 2023-11-21 NOTE — H&P ADULT - HISTORY OF PRESENT ILLNESS
64 yo female with pmhx Bladder CA s/p cystectomy/neobladder procedure with subsequent PE on Eliquis presenting to the ED with complaint of BRBPR. This initially started 10 days ago withg small spots seen on the toilet paper, but today she noted a moderate amount of blood in the toilet bowl. Patient was seen by [facility] today and had CBC drawn with Hgb 11.2, repeat here was 10.4. She denies symptoms of lightheadedness, dizziness, fatigue, syncope. No hematemesis, hematuria, hemoptysis  62 yo female with pmhx Bladder CA s/p cystectomy/neobladder procedure with subsequent PE on Eliquis presenting to the ED with complaint of BRBPR. This initially started 10 days ago with small spots seen on the toilet paper, but today she noted a large amount of blood in the toilet bowl. She has 5 episodes of bloody stool in the past 24 hours. Patient was seen by [facility] today and had CBC drawn with Hgb 11.2, repeat here was 10.4. She denies symptoms of lightheadedness, dizziness, fatigue, syncope. No hematemesis, hematuria, hemoptysis

## 2023-11-21 NOTE — CONSULT NOTE ADULT - ASSESSMENT
INCOMPLETE    62 yo female with pmhx Bladder CA s/p cystectomy/neobladder procedure (on opdivo q4 weeks) with subsequent PE on Eliquis presenting to the ED with CC of BRBPR. Colonoscopy performed 1/23 at [facility] [place] with just hemorrhoids. Hb stable at 10.3 Brown stool in rectal vault. BRBPR likely in setting of hemorrhoids  -No indication for endoscopic intervention at this time  -can discharge patient on Anusol HC suppository or ointment     64 yo female with pmhx Bladder CA s/p cystectomy/neobladder procedure (on opdivo q4 weeks) with subsequent PE on Eliquis presenting to the ED with CC of BRBPR. Colonoscopy performed 1/23 at [facility] [place] with just hemorrhoids. Hb stable at 10.3 Brown stool in rectal vault. BRBPR likely in setting of hemorrhoids.    Plan:   - No emergent indication for endoscopic intervention at this time  - Can discharge patient on Anusol HC suppository or ointment  See attending attestation below for additional recommendations.

## 2023-11-21 NOTE — CONSULT NOTE ADULT - SUBJECTIVE AND OBJECTIVE BOX
64 yo female with pmhx Bladder CA s/p cystectomy/neobladder procedure with subsequent PE on Eliquis presenting to the 64 yo female with pmhx Bladder CA s/p cystectomy/neobladder procedure (on opdivo q4 weeks) with subsequent PE on Eliquis presenting to the ED with CC of BRBPR. Patient states a few days ago she noted blood on the toilet paper when she wipes. However, yesterday she noted blood in the toilet which concerned her. She states they water was blood tinged but she could see through it. She denies any constipation. She has 3-4 formed stools daily. She denies abdominal pain, N/V, fever, chills, melena, NSAID use, GERD, weight loss. She had a colonoscopy at [facility] in January, 2023, which just showed hemorrhoids.     allergies-ceftriaxone, cipro, heparin  surgeries-cholecystectomy, total hysterectomy, cystectomy  family hx-no crc or ibd  social hx-former smoker (quit 21 yrs ago 1/2 ppd, drink socially, denies drug use), Doctor in [place]      T(C): 36.6 (11-21-23 @ 07:40), Max: 36.8 (11-20-23 @ 20:22)  HR: 67 (11-21-23 @ 07:40) (67 - 84)  BP: 104/69 (11-21-23 @ 07:40) (96/63 - 116/76)  RR: 17 (11-21-23 @ 07:40) (17 - 17)  SpO2: 98% (11-21-23 @ 07:40) (98% - 99%)  Wt(kg): --Vital Signs Last 24 Hrs    Parameters below as of 21 Nov 2023 07:40  Patient On (Oxygen Delivery Method): room air        Review of Systems:  -All other ROS negative, except those noted in HPI    PHYSICAL EXAM:  GENERAL: NAD, well-groomed, well-developed  HEAD:  Atraumatic, Normocephalic  EYES: EOMI, PERRLA, conjunctiva and sclera clear  ENMT: Moist mucous membranes, Good dentition, No lesions  NECK: Supple,   NERVOUS SYSTEM:  Alert & Oriented X3, Good concentration;   CHEST/LUNG: Clear to percussion bilaterally; No rales, rhonchi, wheezing, or rubs  HEART: Regular rate and rhythm; No murmurs, rubs, or gallops  ABDOMEN: Soft, Nontender, Nondistended;   EXTREMITIES:  No clubbing, cyanosis, or edema  SKIN: No rashes or lesions    acetaminophen     Tablet .. 650 milliGRAM(s) Oral every 6 hours PRN  aluminum hydroxide/magnesium hydroxide/simethicone Suspension 30 milliLiter(s) Oral every 4 hours PRN  melatonin 3 milliGRAM(s) Oral at bedtime PRN  ondansetron Injectable 4 milliGRAM(s) IV Push every 8 hours PRN  pantoprazole  Injectable 40 milliGRAM(s) IV Push two times a day      LABS:                        10.3   3.36  )-----------( 129      ( 21 Nov 2023 05:15 )             31.3     11-21    142  |  107  |  32.9<H>  ----------------------------<  105<H>  4.0   |  25.0  |  0.80    Ca    8.9      21 Nov 2023 05:15    TPro  6.1<L>  /  Alb  4.0  /  TBili  <0.2<L>  /  DBili  x   /  AST  19  /  ALT  16  /  AlkPhos  53  11-20    PT/INR - ( 20 Nov 2023 23:05 )   PT: 10.5 sec;   INR: 0.94 ratio           Urinalysis Basic - ( 21 Nov 2023 05:15 )    Color: x / Appearance: x / SG: x / pH: x  Gluc: 105 mg/dL / Ketone: x  / Bili: x / Urobili: x   Blood: x / Protein: x / Nitrite: x   Leuk Esterase: x / RBC: x / WBC x   Sq Epi: x / Non Sq Epi: x / Bacteria: x      CAPILLARY BLOOD GLUCOSE            Urinalysis Basic - ( 21 Nov 2023 05:15 )    Color: x / Appearance: x / SG: x / pH: x  Gluc: 105 mg/dL / Ketone: x  / Bili: x / Urobili: x   Blood: x / Protein: x / Nitrite: x   Leuk Esterase: x / RBC: x / WBC x   Sq Epi: x / Non Sq Epi: x / Bacteria: x         64 yo female with pmhx Bladder CA s/p cystectomy/neobladder procedure (on opdivo q4 weeks) with subsequent PE on Eliquis presenting to the ED with CC of BRBPR. Patient states a few days ago she noted blood on the toilet paper when she wipes. However, yesterday she noted blood in the toilet which concerned her. She states they water was blood tinged but she could see through it. She denies any constipation. She has 3-4 formed stools daily. She denies abdominal pain, N/V, fever, chills, melena, NSAID use, GERD, weight loss. She had a colonoscopy at [facility] [place] in January, 2023, which just showed hemorrhoids.     allergies-ceftriaxone, cipro, heparin  surgeries-cholecystectomy, total hysterectomy, cystectomy  family hx-no crc or ibd  social hx-former smoker (quit 21 yrs ago 1/2 ppd, drink socially, denies drug use), Doctor in [place]      T(C): 36.6 (11-21-23 @ 07:40), Max: 36.8 (11-20-23 @ 20:22)  HR: 67 (11-21-23 @ 07:40) (67 - 84)  BP: 104/69 (11-21-23 @ 07:40) (96/63 - 116/76)  RR: 17 (11-21-23 @ 07:40) (17 - 17)  SpO2: 98% (11-21-23 @ 07:40) (98% - 99%)  Wt(kg): --Vital Signs Last 24 Hrs    Parameters below as of 21 Nov 2023 07:40  Patient On (Oxygen Delivery Method): room air    Review of Systems:  -All other ROS negative, except those noted in HPI    PHYSICAL EXAM:  GENERAL: NAD, well-groomed, well-developed  HEAD:  Atraumatic, Normocephalic  EYES: EOMI, PERRLA, conjunctiva and sclera clear  ENMT: Moist mucous membranes, Good dentition,   NECK: Supple,   NERVOUS SYSTEM:  Alert & Oriented X3, Good concentration;   CHEST/LUNG: No accessory muscle use, on RA  HEART: Regular rate and rhythm;   ABDOMEN: Soft, Nontender, Nondistended;   rectal: brown stool in vault   EXTREMITIES:  No clubbing, cyanosis, or edema  SKIN: No rashes or lesions    acetaminophen     Tablet .. 650 milliGRAM(s) Oral every 6 hours PRN  aluminum hydroxide/magnesium hydroxide/simethicone Suspension 30 milliLiter(s) Oral every 4 hours PRN  melatonin 3 milliGRAM(s) Oral at bedtime PRN  ondansetron Injectable 4 milliGRAM(s) IV Push every 8 hours PRN  pantoprazole  Injectable 40 milliGRAM(s) IV Push two times a day      LABS:                        10.3   3.36  )-----------( 129      ( 21 Nov 2023 05:15 )             31.3     11-21    142  |  107  |  32.9<H>  ----------------------------<  105<H>  4.0   |  25.0  |  0.80    Ca    8.9      21 Nov 2023 05:15    TPro  6.1<L>  /  Alb  4.0  /  TBili  <0.2<L>  /  DBili  x   /  AST  19  /  ALT  16  /  AlkPhos  53  11-20    PT/INR - ( 20 Nov 2023 23:05 )   PT: 10.5 sec;   INR: 0.94 ratio           Urinalysis Basic - ( 21 Nov 2023 05:15 )    Color: x / Appearance: x / SG: x / pH: x  Gluc: 105 mg/dL / Ketone: x  / Bili: x / Urobili: x   Blood: x / Protein: x / Nitrite: x   Leuk Esterase: x / RBC: x / WBC x   Sq Epi: x / Non Sq Epi: x / Bacteria: x      CAPILLARY BLOOD GLUCOSE            Urinalysis Basic - ( 21 Nov 2023 05:15 )    Color: x / Appearance: x / SG: x / pH: x  Gluc: 105 mg/dL / Ketone: x  / Bili: x / Urobili: x   Blood: x / Protein: x / Nitrite: x   Leuk Esterase: x / RBC: x / WBC x   Sq Epi: x / Non Sq Epi: x / Bacteria: x         Gastroenterology Consult Note   11/21/2023     HPI:   62 yo female with pmhx Bladder CA s/p cystectomy/neobladder procedure (on opdivo q4 weeks) with subsequent PE on Eliquis presenting to the ED with CC of BRBPR. Patient states a few days ago she noted blood on the toilet paper when she wipes. However, yesterday she noted blood in the toilet which concerned her. She states they water was blood tinged but she could see through it. She denies any constipation. She has 3-4 formed stools daily. She denies abdominal pain, N/V, fever, chills, melena, NSAID use, GERD, weight loss. She had a colonoscopy at [facility] in January, 2023, which just showed hemorrhoids.     allergies-ceftriaxone, cipro, heparin  surgeries-cholecystectomy, total hysterectomy, cystectomy  family hx-no crc or ibd  social hx-former smoker (quit 21 yrs ago 1/2 ppd, drink socially, denies drug use), Doctor in [place]    T(C): 36.6 (11-21-23 @ 07:40), Max: 36.8 (11-20-23 @ 20:22)  HR: 67 (11-21-23 @ 07:40) (67 - 84)  BP: 104/69 (11-21-23 @ 07:40) (96/63 - 116/76)  RR: 17 (11-21-23 @ 07:40) (17 - 17)  SpO2: 98% (11-21-23 @ 07:40) (98% - 99%)  Wt(kg): --Vital Signs Last 24 Hrs    Parameters below as of 21 Nov 2023 07:40  Patient On (Oxygen Delivery Method): room air    Review of Systems:  -All other ROS negative, except those noted in HPI    PHYSICAL EXAM:  GENERAL: NAD, well-groomed, well-developed  HEAD:  Atraumatic, Normocephalic  EYES: EOMI, PERRLA, conjunctiva and sclera clear  ENMT: Moist mucous membranes, Good dentition,   NECK: Supple,   NERVOUS SYSTEM:  Alert & Oriented X3, Good concentration;   CHEST/LUNG: No accessory muscle use, on RA  HEART: Regular rate and rhythm;   ABDOMEN: Soft, Nontender, Nondistended;   rectal: brown stool in vault   EXTREMITIES:  No clubbing, cyanosis, or edema  SKIN: No rashes or lesions    acetaminophen     Tablet .. 650 milliGRAM(s) Oral every 6 hours PRN  aluminum hydroxide/magnesium hydroxide/simethicone Suspension 30 milliLiter(s) Oral every 4 hours PRN  melatonin 3 milliGRAM(s) Oral at bedtime PRN  ondansetron Injectable 4 milliGRAM(s) IV Push every 8 hours PRN  pantoprazole  Injectable 40 milliGRAM(s) IV Push two times a day    LABS:             10.3   3.36  )-----------( 129      ( 21 Nov 2023 05:15 )             31.3     11-21    142  |  107  |  32.9<H>  ----------------------------<  105<H>  4.0   |  25.0  |  0.80    Ca    8.9      21 Nov 2023 05:15    TPro  6.1<L>  /  Alb  4.0  /  TBili  <0.2<L>  /  DBili  x   /  AST  19  /  ALT  16  /  AlkPhos  53  11-20  PT/INR - ( 20 Nov 2023 23:05 )   PT: 10.5 sec;   INR: 0.94 ratio    Urinalysis Basic - ( 21 Nov 2023 05:15 )    Color: x / Appearance: x / SG: x / pH: x  Gluc: 105 mg/dL / Ketone: x  / Bili: x / Urobili: x   Blood: x / Protein: x / Nitrite: x   Leuk Esterase: x / RBC: x / WBC x   Sq Epi: x / Non Sq Epi: x / Bacteria: x    CAPILLARY BLOOD GLUCOSE  Urinalysis Basic - ( 21 Nov 2023 05:15 )    Color: x / Appearance: x / SG: x / pH: x  Gluc: 105 mg/dL / Ketone: x  / Bili: x / Urobili: x   Blood: x / Protein: x / Nitrite: x   Leuk Esterase: x / RBC: x / WBC x   Sq Epi: x / Non Sq Epi: x / Bacteria: x

## 2023-11-21 NOTE — ED ADULT NURSE NOTE - OBJECTIVE STATEMENT
pt A & Ox4 reporting rectal bleeding. Respirations even and unlabored. Denies chest pain or SOB. Pt reports seeing bright red blood in toilet after bowel movement. Pt reports having internal hemorrhoids. Denies N/V/D. Pt denies dizziness, SOB, chest pain. IV established. Blood specimens sent to lab.

## 2023-11-21 NOTE — CONSULT NOTE ADULT - SUBJECTIVE AND OBJECTIVE BOX
62 yo female with pmhx Bladder CA s/p cystectomy/neobladder procedure (on opdivo q4 weeks) with subsequent PE on Eliquis presenting to the ED with CC of BRBPR. Patient states a few days ago she noted blood on the toilet paper when she wipes. However, yesterday she noted blood in the toilet which concerned her. She states they water was blood tinged but she could see through it. She denies any constipation. She has 3-4 formed stools daily. She denies abdominal pain, N/V, fever, chills, melena, NSAID use, GERD, weight loss. She had a [facility] [place] [facility] in January, [facility]23, which just showed hemorrhoids.     allergies-ceftriaxone, cipro, heparin  surgeries-cholecystectomy, total hysterectomy, cystectomy  family hx-no crc or ibd  social hx-former smoker (quit 21 yrs ago 1/2 ppd, drink socially, denies drug use), Doctor in [place]      T(C): 36.6 (11-21-23 @ 07:40), Max: 36.8 (11-20-23 @ 20:22)  HR: 67 (11-21-23 @ 07:40) (67 - 84)  BP: 104/69 (11-21-23 @ 07:40) (96/63 - 116/76)  RR: 17 (11-21-23 @ 07:40) (17 - 17)  SpO2: 98% (11-21-23 @ 07:40) (98% - 99%)  Wt(kg): --Vital Signs Last 24 Hrs    Parameters below as of 21 Nov 2023 07:40  Patient On (Oxygen Delivery Method): room air    Review of Systems:  -All other ROS negative, except those noted in HPI    PHYSICAL EXAM:  GENERAL: NAD, well-groomed, well-developed  HEAD:  Atraumatic, Normocephalic  EYES: EOMI, PERRLA, conjunctiva and sclera clear  ENMT: Moist mucous membranes, Good dentition,   NECK: Supple,   NERVOUS SYSTEM:  Alert & Oriented X3, Good concentration;   CHEST/LUNG: No accessory muscle use, on RA  HEART: Regular rate and rhythm;   ABDOMEN: Soft, Nontender, Nondistended;   rectal: brown stool in vault   EXTREMITIES:  No clubbing, cyanosis, or edema  SKIN: No rashes or lesions    acetaminophen     Tablet .. 650 milliGRAM(s) Oral every 6 hours PRN  aluminum hydroxide/magnesium hydroxide/simethicone Suspension 30 milliLiter(s) Oral every 4 hours PRN  melatonin 3 milliGRAM(s) Oral at bedtime PRN  ondansetron Injectable 4 milliGRAM(s) IV Push every 8 hours PRN  pantoprazole  Injectable 40 milliGRAM(s) IV Push two times a day      LABS:                        10.3   3.36  )-----------( 129      ( 21 Nov 2023 05:15 )             31.3     11-21    142  |  107  |  32.9<H>  ----------------------------<  105<H>  4.0   |  25.0  |  0.80    Ca    8.9      21 Nov 2023 05:15    TPro  6.1<L>  /  Alb  4.0  /  TBili  <0.2<L>  /  DBili  x   /  AST  19  /  ALT  16  /  AlkPhos  53  11-20    PT/INR - ( 20 Nov 2023 23:05 )   PT: 10.5 sec;   INR: 0.94 ratio           Urinalysis Basic - ( 21 Nov 2023 05:15 )    Color: x / Appearance: x / SG: x / pH: x  Gluc: 105 mg/dL / Ketone: x  / Bili: x / Urobili: x   Blood: x / Protein: x / Nitrite: x   Leuk Esterase: x / RBC: x / WBC x   Sq Epi: x / Non Sq Epi: x / Bacteria: x      CAPILLARY BLOOD GLUCOSE    Urinalysis Basic - ( 21 Nov 2023 05:15 )  Color: x / Appearance: x / SG: x / pH: x  Gluc: 105 mg/dL / Ketone: x  / Bili: x / Urobili: x   Blood: x / Protein: x / Nitrite: x   Leuk Esterase: x / RBC: x / WBC x   Sq Epi: x / Non Sq Epi: x / Bacteria: x

## 2023-11-21 NOTE — PROVIDER CONTACT NOTE (OTHER) - SITUATION
Received pt from ED during skin check noticed red rash to bilateral buttocks, pt states it is from immunotherapy and requesting benadryl at this time

## 2023-11-21 NOTE — ED ADULT NURSE NOTE - CHIEF COMPLAINT QUOTE
pt presents c/o bright red rectal bleeding starting 1am, sent by hematologist at [facility]. pt on eliquis, denies dizziness/lightheadedness, abd pain

## 2023-11-22 ENCOUNTER — TRANSCRIPTION ENCOUNTER (OUTPATIENT)
Age: 63
End: 2023-11-22

## 2023-11-22 VITALS
DIASTOLIC BLOOD PRESSURE: 67 MMHG | TEMPERATURE: 97 F | HEART RATE: 81 BPM | OXYGEN SATURATION: 100 % | RESPIRATION RATE: 18 BRPM | SYSTOLIC BLOOD PRESSURE: 101 MMHG

## 2023-11-22 LAB
ANION GAP SERPL CALC-SCNC: 10 MMOL/L — SIGNIFICANT CHANGE UP (ref 5–17)
ANION GAP SERPL CALC-SCNC: 10 MMOL/L — SIGNIFICANT CHANGE UP (ref 5–17)
BUN SERPL-MCNC: 31.8 MG/DL — HIGH (ref 8–20)
BUN SERPL-MCNC: 31.8 MG/DL — HIGH (ref 8–20)
CALCIUM SERPL-MCNC: 9.1 MG/DL — SIGNIFICANT CHANGE UP (ref 8.4–10.5)
CALCIUM SERPL-MCNC: 9.1 MG/DL — SIGNIFICANT CHANGE UP (ref 8.4–10.5)
CHLORIDE SERPL-SCNC: 108 MMOL/L — SIGNIFICANT CHANGE UP (ref 96–108)
CHLORIDE SERPL-SCNC: 108 MMOL/L — SIGNIFICANT CHANGE UP (ref 96–108)
CO2 SERPL-SCNC: 25 MMOL/L — SIGNIFICANT CHANGE UP (ref 22–29)
CO2 SERPL-SCNC: 25 MMOL/L — SIGNIFICANT CHANGE UP (ref 22–29)
CREAT SERPL-MCNC: 0.75 MG/DL — SIGNIFICANT CHANGE UP (ref 0.5–1.3)
CREAT SERPL-MCNC: 0.75 MG/DL — SIGNIFICANT CHANGE UP (ref 0.5–1.3)
EGFR: 90 ML/MIN/1.73M2 — SIGNIFICANT CHANGE UP
EGFR: 90 ML/MIN/1.73M2 — SIGNIFICANT CHANGE UP
GLUCOSE SERPL-MCNC: 111 MG/DL — HIGH (ref 70–99)
GLUCOSE SERPL-MCNC: 111 MG/DL — HIGH (ref 70–99)
HCT VFR BLD CALC: 32.8 % — LOW (ref 34.5–45)
HCT VFR BLD CALC: 32.8 % — LOW (ref 34.5–45)
HCV AB S/CO SERPL IA: 0.13 S/CO — SIGNIFICANT CHANGE UP (ref 0–0.99)
HCV AB S/CO SERPL IA: 0.13 S/CO — SIGNIFICANT CHANGE UP (ref 0–0.99)
HCV AB SERPL-IMP: SIGNIFICANT CHANGE UP
HCV AB SERPL-IMP: SIGNIFICANT CHANGE UP
HGB BLD-MCNC: 10.8 G/DL — LOW (ref 11.5–15.5)
HGB BLD-MCNC: 10.8 G/DL — LOW (ref 11.5–15.5)
MCHC RBC-ENTMCNC: 30.9 PG — SIGNIFICANT CHANGE UP (ref 27–34)
MCHC RBC-ENTMCNC: 30.9 PG — SIGNIFICANT CHANGE UP (ref 27–34)
MCHC RBC-ENTMCNC: 32.9 GM/DL — SIGNIFICANT CHANGE UP (ref 32–36)
MCHC RBC-ENTMCNC: 32.9 GM/DL — SIGNIFICANT CHANGE UP (ref 32–36)
MCV RBC AUTO: 93.7 FL — SIGNIFICANT CHANGE UP (ref 80–100)
MCV RBC AUTO: 93.7 FL — SIGNIFICANT CHANGE UP (ref 80–100)
PLATELET # BLD AUTO: 130 K/UL — LOW (ref 150–400)
PLATELET # BLD AUTO: 130 K/UL — LOW (ref 150–400)
POTASSIUM SERPL-MCNC: 4.7 MMOL/L — SIGNIFICANT CHANGE UP (ref 3.5–5.3)
POTASSIUM SERPL-MCNC: 4.7 MMOL/L — SIGNIFICANT CHANGE UP (ref 3.5–5.3)
POTASSIUM SERPL-SCNC: 4.7 MMOL/L — SIGNIFICANT CHANGE UP (ref 3.5–5.3)
POTASSIUM SERPL-SCNC: 4.7 MMOL/L — SIGNIFICANT CHANGE UP (ref 3.5–5.3)
RBC # BLD: 3.5 M/UL — LOW (ref 3.8–5.2)
RBC # BLD: 3.5 M/UL — LOW (ref 3.8–5.2)
RBC # FLD: 12.6 % — SIGNIFICANT CHANGE UP (ref 10.3–14.5)
RBC # FLD: 12.6 % — SIGNIFICANT CHANGE UP (ref 10.3–14.5)
SODIUM SERPL-SCNC: 143 MMOL/L — SIGNIFICANT CHANGE UP (ref 135–145)
SODIUM SERPL-SCNC: 143 MMOL/L — SIGNIFICANT CHANGE UP (ref 135–145)
WBC # BLD: 3.41 K/UL — LOW (ref 3.8–10.5)
WBC # BLD: 3.41 K/UL — LOW (ref 3.8–10.5)
WBC # FLD AUTO: 3.41 K/UL — LOW (ref 3.8–10.5)
WBC # FLD AUTO: 3.41 K/UL — LOW (ref 3.8–10.5)

## 2023-11-22 PROCEDURE — 80048 BASIC METABOLIC PNL TOTAL CA: CPT

## 2023-11-22 PROCEDURE — 99239 HOSP IP/OBS DSCHRG MGMT >30: CPT

## 2023-11-22 PROCEDURE — 86901 BLOOD TYPING SEROLOGIC RH(D): CPT

## 2023-11-22 PROCEDURE — 86803 HEPATITIS C AB TEST: CPT

## 2023-11-22 PROCEDURE — 36415 COLL VENOUS BLD VENIPUNCTURE: CPT

## 2023-11-22 PROCEDURE — 82785 ASSAY OF IGE: CPT

## 2023-11-22 PROCEDURE — 83690 ASSAY OF LIPASE: CPT

## 2023-11-22 PROCEDURE — 85027 COMPLETE CBC AUTOMATED: CPT

## 2023-11-22 PROCEDURE — 86900 BLOOD TYPING SEROLOGIC ABO: CPT

## 2023-11-22 PROCEDURE — 85610 PROTHROMBIN TIME: CPT

## 2023-11-22 PROCEDURE — 99285 EMERGENCY DEPT VISIT HI MDM: CPT | Mod: 25

## 2023-11-22 PROCEDURE — 85025 COMPLETE CBC W/AUTO DIFF WBC: CPT

## 2023-11-22 PROCEDURE — 80053 COMPREHEN METABOLIC PANEL: CPT

## 2023-11-22 PROCEDURE — 86850 RBC ANTIBODY SCREEN: CPT

## 2023-11-22 RX ORDER — SENNA PLUS 8.6 MG/1
2 TABLET ORAL
Qty: 60 | Refills: 0
Start: 2023-11-22 | End: 2023-12-21

## 2023-11-22 RX ORDER — HYDROCORTISONE 1 %
1 OINTMENT (GRAM) TOPICAL
Qty: 60 | Refills: 0
Start: 2023-11-22 | End: 2023-12-21

## 2023-11-22 RX ORDER — POLYETHYLENE GLYCOL 3350 17 G/17G
17 POWDER, FOR SOLUTION ORAL
Qty: 510 | Refills: 0
Start: 2023-11-22 | End: 2023-12-21

## 2023-11-22 RX ORDER — APIXABAN 2.5 MG/1
1 TABLET, FILM COATED ORAL
Refills: 0
Start: 2023-11-22

## 2023-11-22 RX ADMIN — PANTOPRAZOLE SODIUM 40 MILLIGRAM(S): 20 TABLET, DELAYED RELEASE ORAL at 05:12

## 2023-11-22 RX ADMIN — APIXABAN 5 MILLIGRAM(S): 2.5 TABLET, FILM COATED ORAL at 05:12

## 2023-11-22 NOTE — DISCHARGE NOTE PROVIDER - NSDCMRMEDTOKEN_GEN_ALL_CORE_FT
Eliquis 5 mg oral tablet: 1 tab(s) orally 2 times a day  Opdivo 10 mg/mL intravenous solution: 240 milligram(s) intravenously every 4 weeks   Eliquis 5 mg oral tablet: 1 tab(s) orally 2 times a day  hydrocortisone 25 mg rectal suppository: 1 suppository(ies) rectal 2 times a day as needed for rectal itching  Opdivo 10 mg/mL intravenous solution: 240 milligram(s) intravenously every 4 weeks  polyethylene glycol 3350 oral powder for reconstitution: 17 gram(s) orally once a day  senna leaf extract oral tablet: 2 tab(s) orally once a day (at bedtime)

## 2023-11-22 NOTE — DISCHARGE NOTE NURSING/CASE MANAGEMENT/SOCIAL WORK - NSDCPEFALRISK_GEN_ALL_CORE
For information on Fall & Injury Prevention, visit: [email or web address] OR  [email or web address] OR  https://www.cdc.gov/steadi/patient.html

## 2023-11-22 NOTE — PROGRESS NOTE ADULT - ASSESSMENT
62 yo female with pmhx Bladder CA s/p cystectomy/neobladder procedure (on opdivo q4 weeks) with subsequent PE on Eliquis presenting to the ED with CC of BRBPR. Colonoscopy performed 1/23 at [facility] with just hemorrhoids. Hb stable at 10.3 Brown stool in rectal vault. BRBPR likely in setting of hemorrhoids    Hemorrhoidal bleed:  -No indication for endoscopic intervention at this time as per GI  -follow up GI recs, recommend Anusok HC suppository or ointment   - monitor Hb (latest Hb 10.8)  - AC resumed and for d/c later today    Hx of bladder cancer:   - on Opdivo on outpt basis, to be held during inpatient stay  - follows up with [facility] and will update daily  - has groin itching rash, has outpatient appointment with dermatology, to keep appointment with derm upon d/c.     History of PE:  - was diagnosed with PE in postsurgical setting in Feb 2023 and has been on ELiquis since then  - given no active bleeding with stable Hb and if cleared from GI standpoint, agree with restarting AC    Will follow

## 2023-11-22 NOTE — PROGRESS NOTE ADULT - SUBJECTIVE AND OBJECTIVE BOX
64 yo female with pmhx Bladder CA s/p cystectomy/neobladder procedure (on opdivo q4 weeks) with subsequent PE on Eliquis presenting to the ED with CC of BRBPR. Patient states a few days ago she noted blood on the toilet paper when she wipes. However, yesterday she noted blood in the toilet which concerned her. She states they water was blood tinged but she could see through it. She denies any constipation. She has 3-4 formed stools daily. She denies abdominal pain, N/V, fever, chills, melena, NSAID use, GERD, weight loss. She had [place] [facility] [place], which just showed hemorrhoids.     11/22: Hb stable at 10.8. AC resumed. for possible d/c today. BM yesterday evening with scant blood reportedly . No new complaints.    allergies-ceftriaxone, cipro, heparin  surgeries-cholecystectomy, total hysterectomy, cystectomy  family hx-no crc or ibd  social hx-former smoker (quit 21 yrs ago 1/2 ppd, drink socially, denies drug use), Doctor in [place]    Review of Systems:  -All other ROS negative, except those noted in HPI    PHYSICAL EXAM:  GENERAL: NAD, well-groomed, well-developed  HEAD:  Atraumatic, Normocephalic  EYES: EOMI, PERRLA, conjunctiva and sclera clear  ENMT: Moist mucous membranes, Good dentition,   NECK: Supple,   NERVOUS SYSTEM:  Alert & Oriented X3, Good concentration;   CHEST/LUNG: No accessory muscle use, on RA  HEART: Regular rate and rhythm;   ABDOMEN: Soft, Nontender, Nondistended;   rectal: brown stool in vault   EXTREMITIES:  No clubbing, cyanosis, or edema  SKIN: No rashes or lesions

## 2023-11-22 NOTE — DISCHARGE NOTE PROVIDER - HOSPITAL COURSE
64 yo female with pmhx Bladder CA s/p cystectomy/neobladder procedure with subsequent PE on Eliquis presenting to the ED with complaint of BRBPR. Pt admitted to [facility] for ABLA. GI was consulted. She recently underwent [place] [facility] noted to have internal hemorrhoids. She admits that due to difficulty with micturition she spends long periods of time (upwards of 30-40 minutes) on the toilet and increased intra-abdominal pressure which likely has exacerbated her hemorrhoids. Hgb levels have remained stable. Eliquis was restarted. Pt educated on the importance of not straining during BM's. Pt has since improved overall and is now medically stable for discharge with appropriate outpatient follow up.     All electrolyte abnormalities were monitored carefully and repleted as necessary during this hospitalization. At the time of discharge patient was hemodynamically stable and amenable to all terms of discharge.        62 yo female with pmhx Bladder CA s/p cystectomy/neobladder procedure with subsequent PE on Eliquis presenting to the ED with complaint of BRBPR and interval drop in Hgb by 1 point    She admitted to [facility] for ABLA. GI was consulted. She recently underwent Colonoscopy 1/2023 (at [facility] [place] [place] noted to have internal hemorrhoids. She admits that due to difficulty with micturition she spends long periods of time (upwards of 30-40 minutes) on the toilet and increased intra-abdominal pressure which likely has exacerbated her hemorrhoids. Hgb levels have remained stable. Eliquis was restarted. Pt educated on the importance of not straining during BM's. Pt has since improved overall and is now medically stable for discharge with appropriate outpatient follow up.     All electrolyte abnormalities were monitored carefully and replete as necessary during this hospitalization. At the time of discharge patient was hemodynamically stable and amenable to all terms of discharge.

## 2023-11-22 NOTE — DISCHARGE NOTE PROVIDER - CARE PROVIDER_API CALL
Davy Giron  Hematology/Oncology  [address] [place] [id number]  Phone: [phone]  Fax: [phone]  Follow Up Time: 1 week    PCP,   Phone: (   )    -  Fax: (   )    -  Follow Up Time: 1 week    Wade Cole  Gastroenterology  [address] [facility] [facility] [address]  [place] [address]  Phone: [phone]  Fax: [phone]  Follow Up Time: 1 week

## 2023-11-22 NOTE — DISCHARGE NOTE PROVIDER - PROVIDER TOKENS
PROVIDER:[TOKEN:[[id number]],FOLLOWUP:[1 week]],FREE:[LAST:[PCP],PHONE:[(   )    -],FAX:[(   )    -],FOLLOWUP:[1 week]],PROVIDER:[TOKEN:[[id number]],FOLLOWUP:[1 week]]

## 2023-11-22 NOTE — DISCHARGE NOTE NURSING/CASE MANAGEMENT/SOCIAL WORK - PATIENT PORTAL LINK FT
You can access the FollowMyHealth Patient Portal offered by [facility] by registering at the following website: [email or web address]. By joining [employer]’s FollowMyHealth portal, you will also be able to view your health information using other applications (apps) compatible with our system.

## 2023-11-22 NOTE — DISCHARGE NOTE PROVIDER - ATTENDING DISCHARGE PHYSICAL EXAMINATION:
OBJECTIVE:  Vital Signs Last 24 Hrs  T(C): 36.3 (22 Nov 2023 11:27), Max: 37 (21 Nov 2023 23:35)  T(F): 97.4 (22 Nov 2023 11:27), Max: 98.6 (21 Nov 2023 23:35)  HR: 81 (22 Nov 2023 11:27) (72 - 92)  BP: 101/67 (22 Nov 2023 11:27) (96/64 - 108/71)  BP(mean): --  RR: 18 (22 Nov 2023 11:27) (18 - 18)  SpO2: 100% (22 Nov 2023 11:27) (98% - 100%)    Parameters below as of 22 Nov 2023 11:27  Patient On (Oxygen Delivery Method): room air        PHYSICAL EXAMINATION  General: Middle aged female lying on stretcher, comfortable  HEENT:  Pale conjunctiva, anicteric  NECK:  Supple  CVS: regular rate and rhythm S1 S2  RESP:  CTAB  GI:  Soft nondistended nontender BS+. Well healed scars  : No suprapubic tenderness  MSK:  FROM  CNS:  AOX3. No gross focal or global deficit appreciated  INTEG:  warm dry skin  PSYCH:  Fair mood

## 2023-11-22 NOTE — DISCHARGE NOTE PROVIDER - NSDCCPCAREPLAN_GEN_ALL_CORE_FT
PRINCIPAL DISCHARGE DIAGNOSIS  Diagnosis: GI bleed  Assessment and Plan of Treatment: - Likely secondary to hemorrhoids  - Avoid straining during bowel movements  - Follow up with PCP and GI in 1 week      SECONDARY DISCHARGE DIAGNOSES  Diagnosis: Anemia due to acute blood loss  Assessment and Plan of Treatment: - As above    Diagnosis: History of bladder cancer  Assessment and Plan of Treatment: - Follow up with heme/onc in 1 week     PRINCIPAL DISCHARGE DIAGNOSIS  Diagnosis: GI bleed  Assessment and Plan of Treatment: - Likely secondary to hemorrhoids  - Avoid straining during bowel movements  - Laxative to help  - Follow up with PCP and GI in 1 week      SECONDARY DISCHARGE DIAGNOSES  Diagnosis: Anemia due to acute blood loss  Assessment and Plan of Treatment: - As above    Diagnosis: Pancytopenia  Assessment and Plan of Treatment: Follow up with Oncology    Diagnosis: History of bladder cancer  Assessment and Plan of Treatment: - Follow up with heme/onc in 1 week     PRINCIPAL DISCHARGE DIAGNOSIS  Diagnosis: GI bleed  Assessment and Plan of Treatment: - Likely secondary to hemorrhoids  - Avoid straining during bowel movements  - Laxative to help  - Follow up with PCP and GI in 1 week      SECONDARY DISCHARGE DIAGNOSES  Diagnosis: Anemia due to acute blood loss  Assessment and Plan of Treatment: - As above    Diagnosis: Pancytopenia  Assessment and Plan of Treatment: Follow up with Oncology    Diagnosis: History of bladder cancer  Assessment and Plan of Treatment: - Follow up with heme/onc in 1 week    Diagnosis: History of pulmonary embolism  Assessment and Plan of Treatment:

## 2023-11-25 LAB
IGE SERPL-ACNC: 124 KU/L — HIGH
IGE SERPL-ACNC: 124 KU/L — HIGH

## 2023-11-28 PROBLEM — I26.99 OTHER PULMONARY EMBOLISM WITHOUT ACUTE COR PULMONALE: Chronic | Status: ACTIVE | Noted: 2023-11-20

## 2023-11-28 PROBLEM — C67.9 MALIGNANT NEOPLASM OF BLADDER, UNSPECIFIED: Chronic | Status: ACTIVE | Noted: 2023-11-20

## 2023-11-29 NOTE — CHART NOTE - NSCHARTNOTEFT_GEN_A_CORE
patient with pancytopenia, which is likely related to anti-neoplastic immunotherapy for bladder cancer.
Patient admitted earlier today for rectal bleeding.  Chart reviewed since admission; patient interviewed and examined.    S:  Bright rectal bleeding  No dizziness, chest pain, palpitation, abdominal pain, nausea or vomiting    O:  hemodynamically stable  Soft nontender nondistended BS+  Well healed scars    CBC reviewed - more or less stable    A:  Lower GI bleed ?Hemorrhoidal  Pancytopenia with eosinophilia  Bladder cancer on immunotherapy    P:  May feed as no procedure  Monitor CBC  Rectal Hydrocortisone  Oncology follow up;     Discussed with Dr Israel  Discussed with DANIEL Lagos    If remains stable without any significant bleeding or change in labs, then anticipate home in next 24 hours

## 2023-11-30 ENCOUNTER — NON-APPOINTMENT (OUTPATIENT)
Age: 63
End: 2023-11-30

## 2023-11-30 ENCOUNTER — APPOINTMENT (OUTPATIENT)
Dept: DERMATOLOGY | Facility: CLINIC | Age: 63
End: 2023-11-30
Payer: OTHER GOVERNMENT

## 2023-11-30 PROCEDURE — 99203 OFFICE O/P NEW LOW 30 MIN: CPT

## 2024-01-09 ENCOUNTER — NON-APPOINTMENT (OUTPATIENT)
Age: 64
End: 2024-01-09

## 2024-05-22 ENCOUNTER — EMERGENCY (EMERGENCY)
Facility: HOSPITAL | Age: 64
LOS: 1 days | Discharge: HOME | End: 2024-05-22
Attending: STUDENT IN AN ORGANIZED HEALTH CARE EDUCATION/TRAINING PROGRAM
Payer: OTHER GOVERNMENT

## 2024-05-22 VITALS
HEART RATE: 73 BPM | RESPIRATION RATE: 20 BRPM | WEIGHT: 160.06 LBS | SYSTOLIC BLOOD PRESSURE: 141 MMHG | TEMPERATURE: 98 F | HEIGHT: 67 IN | DIASTOLIC BLOOD PRESSURE: 90 MMHG | OXYGEN SATURATION: 98 %

## 2024-05-22 DIAGNOSIS — Z90.710 ACQUIRED ABSENCE OF BOTH CERVIX AND UTERUS: Chronic | ICD-10-CM

## 2024-05-22 DIAGNOSIS — Z90.6 ACQUIRED ABSENCE OF OTHER PARTS OF URINARY TRACT: Chronic | ICD-10-CM

## 2024-05-22 LAB
ALBUMIN SERPL ELPH-MCNC: 3.7 G/DL — SIGNIFICANT CHANGE UP (ref 3.3–5.2)
ALP SERPL-CCNC: 68 U/L — SIGNIFICANT CHANGE UP (ref 40–120)
ALT FLD-CCNC: 18 U/L — SIGNIFICANT CHANGE UP
ANION GAP SERPL CALC-SCNC: 10 MMOL/L — SIGNIFICANT CHANGE UP (ref 5–17)
APPEARANCE UR: CLEAR — SIGNIFICANT CHANGE UP
AST SERPL-CCNC: 22 U/L — SIGNIFICANT CHANGE UP
BACTERIA # UR AUTO: ABNORMAL /HPF
BASE EXCESS BLDV CALC-SCNC: 4.5 MMOL/L — HIGH (ref -2–3)
BASOPHILS # BLD AUTO: 0 K/UL — SIGNIFICANT CHANGE UP (ref 0–0.2)
BASOPHILS NFR BLD AUTO: 0 % — SIGNIFICANT CHANGE UP (ref 0–2)
BILIRUB SERPL-MCNC: <0.2 MG/DL — LOW (ref 0.4–2)
BILIRUB UR-MCNC: NEGATIVE — SIGNIFICANT CHANGE UP
BUN SERPL-MCNC: 26.5 MG/DL — HIGH (ref 8–20)
CA-I SERPL-SCNC: 1.21 MMOL/L — SIGNIFICANT CHANGE UP (ref 1.15–1.33)
CALCIUM SERPL-MCNC: 9 MG/DL — SIGNIFICANT CHANGE UP (ref 8.4–10.5)
CAST: 2 /LPF — SIGNIFICANT CHANGE UP (ref 0–4)
CHLORIDE BLDV-SCNC: 104 MMOL/L — SIGNIFICANT CHANGE UP (ref 96–108)
CHLORIDE SERPL-SCNC: 102 MMOL/L — SIGNIFICANT CHANGE UP (ref 96–108)
CO2 SERPL-SCNC: 27 MMOL/L — SIGNIFICANT CHANGE UP (ref 22–29)
COLOR SPEC: YELLOW — SIGNIFICANT CHANGE UP
CREAT SERPL-MCNC: 0.71 MG/DL — SIGNIFICANT CHANGE UP (ref 0.5–1.3)
DIFF PNL FLD: NEGATIVE — SIGNIFICANT CHANGE UP
EGFR: 95 ML/MIN/1.73M2 — SIGNIFICANT CHANGE UP
EOSINOPHIL # BLD AUTO: 0.18 K/UL — SIGNIFICANT CHANGE UP (ref 0–0.5)
EOSINOPHIL NFR BLD AUTO: 6.1 % — HIGH (ref 0–6)
GAS PNL BLDV: 137 MMOL/L — SIGNIFICANT CHANGE UP (ref 136–145)
GAS PNL BLDV: SIGNIFICANT CHANGE UP
GLUCOSE BLDV-MCNC: 88 MG/DL — SIGNIFICANT CHANGE UP (ref 70–99)
GLUCOSE SERPL-MCNC: 93 MG/DL — SIGNIFICANT CHANGE UP (ref 70–99)
GLUCOSE UR QL: NEGATIVE MG/DL — SIGNIFICANT CHANGE UP
HCO3 BLDV-SCNC: 30 MMOL/L — HIGH (ref 22–29)
HCT VFR BLD CALC: 32.2 % — LOW (ref 34.5–45)
HCT VFR BLDA CALC: 33 % — SIGNIFICANT CHANGE UP
HGB BLD CALC-MCNC: 10.9 G/DL — LOW (ref 11.7–16.1)
HGB BLD-MCNC: 10.5 G/DL — LOW (ref 11.5–15.5)
INR BLD: 0.88 RATIO — SIGNIFICANT CHANGE UP (ref 0.85–1.18)
KETONES UR-MCNC: NEGATIVE MG/DL — SIGNIFICANT CHANGE UP
LACTATE BLDV-MCNC: 0.6 MMOL/L — SIGNIFICANT CHANGE UP (ref 0.5–2)
LEUKOCYTE ESTERASE UR-ACNC: ABNORMAL
LIDOCAIN IGE QN: 51 U/L — SIGNIFICANT CHANGE UP (ref 22–51)
LYMPHOCYTES # BLD AUTO: 0.86 K/UL — LOW (ref 1–3.3)
LYMPHOCYTES # BLD AUTO: 29.8 % — SIGNIFICANT CHANGE UP (ref 13–44)
MAGNESIUM SERPL-MCNC: 1.9 MG/DL — SIGNIFICANT CHANGE UP (ref 1.8–2.6)
MANUAL SMEAR VERIFICATION: SIGNIFICANT CHANGE UP
MCHC RBC-ENTMCNC: 29.6 PG — SIGNIFICANT CHANGE UP (ref 27–34)
MCHC RBC-ENTMCNC: 32.6 GM/DL — SIGNIFICANT CHANGE UP (ref 32–36)
MCV RBC AUTO: 90.7 FL — SIGNIFICANT CHANGE UP (ref 80–100)
MONOCYTES # BLD AUTO: 0.38 K/UL — SIGNIFICANT CHANGE UP (ref 0–0.9)
MONOCYTES NFR BLD AUTO: 13.2 % — SIGNIFICANT CHANGE UP (ref 2–14)
NEUTROPHILS # BLD AUTO: 1.46 K/UL — LOW (ref 1.8–7.4)
NEUTROPHILS NFR BLD AUTO: 50.9 % — SIGNIFICANT CHANGE UP (ref 43–77)
NITRITE UR-MCNC: NEGATIVE — SIGNIFICANT CHANGE UP
NT-PROBNP SERPL-SCNC: 73 PG/ML — SIGNIFICANT CHANGE UP (ref 0–300)
PCO2 BLDV: 53 MMHG — HIGH (ref 39–42)
PH BLDV: 7.36 — SIGNIFICANT CHANGE UP (ref 7.32–7.43)
PH UR: 6.5 — SIGNIFICANT CHANGE UP (ref 5–8)
PLAT MORPH BLD: NORMAL — SIGNIFICANT CHANGE UP
PLATELET # BLD AUTO: 150 K/UL — SIGNIFICANT CHANGE UP (ref 150–400)
PO2 BLDV: 59 MMHG — HIGH (ref 25–45)
POIKILOCYTOSIS BLD QL AUTO: SLIGHT — SIGNIFICANT CHANGE UP
POLYCHROMASIA BLD QL SMEAR: SLIGHT — SIGNIFICANT CHANGE UP
POTASSIUM BLDV-SCNC: 3.9 MMOL/L — SIGNIFICANT CHANGE UP (ref 3.5–5.1)
POTASSIUM SERPL-MCNC: 4 MMOL/L — SIGNIFICANT CHANGE UP (ref 3.5–5.3)
POTASSIUM SERPL-SCNC: 4 MMOL/L — SIGNIFICANT CHANGE UP (ref 3.5–5.3)
PROT SERPL-MCNC: 6 G/DL — LOW (ref 6.6–8.7)
PROT UR-MCNC: NEGATIVE MG/DL — SIGNIFICANT CHANGE UP
PROTHROM AB SERPL-ACNC: 9.8 SEC — SIGNIFICANT CHANGE UP (ref 9.5–13)
RBC # BLD: 3.55 M/UL — LOW (ref 3.8–5.2)
RBC # FLD: 13.3 % — SIGNIFICANT CHANGE UP (ref 10.3–14.5)
RBC BLD AUTO: ABNORMAL
RBC CASTS # UR COMP ASSIST: 1 /HPF — SIGNIFICANT CHANGE UP (ref 0–4)
SAO2 % BLDV: 90.9 % — SIGNIFICANT CHANGE UP
SODIUM SERPL-SCNC: 139 MMOL/L — SIGNIFICANT CHANGE UP (ref 135–145)
SP GR SPEC: 1.01 — SIGNIFICANT CHANGE UP (ref 1–1.03)
SQUAMOUS # UR AUTO: 1 /HPF — SIGNIFICANT CHANGE UP (ref 0–5)
TROPONIN T, HIGH SENSITIVITY RESULT: 9 NG/L — SIGNIFICANT CHANGE UP (ref 0–51)
UROBILINOGEN FLD QL: 0.2 MG/DL — SIGNIFICANT CHANGE UP (ref 0.2–1)
WBC # BLD: 2.87 K/UL — LOW (ref 3.8–10.5)
WBC # FLD AUTO: 2.87 K/UL — LOW (ref 3.8–10.5)
WBC UR QL: 10 /HPF — HIGH (ref 0–5)

## 2024-05-22 PROCEDURE — 93010 ELECTROCARDIOGRAM REPORT: CPT

## 2024-05-22 PROCEDURE — 71045 X-RAY EXAM CHEST 1 VIEW: CPT | Mod: 26

## 2024-05-22 PROCEDURE — 99285 EMERGENCY DEPT VISIT HI MDM: CPT

## 2024-05-22 RX ORDER — KETOROLAC TROMETHAMINE 30 MG/ML
15 SYRINGE (ML) INJECTION ONCE
Refills: 0 | Status: DISCONTINUED | OUTPATIENT
Start: 2024-05-22 | End: 2024-05-23

## 2024-05-22 RX ORDER — LIDOCAINE 4 G/100G
10 CREAM TOPICAL ONCE
Refills: 0 | Status: COMPLETED | OUTPATIENT
Start: 2024-05-22 | End: 2024-05-23

## 2024-05-22 NOTE — ED ADULT TRIAGE NOTE - LOCATION:
X Size Of Lesion In Cm (Optional): 0 Detail Level: Detailed Size Of Lesion: 6 mm Body Location Override (Optional - Billing Will Still Be Based On Selected Body Map Location If Applicable): left lateral thigh Body Location Override (Optional - Billing Will Still Be Based On Selected Body Map Location If Applicable): left forearm Detail Level: Generalized Left arm; Body Location Override (Optional - Billing Will Still Be Based On Selected Body Map Location If Applicable): left perianal skin Size Of Lesion: 4mm Detail Level: Zone Body Location Override (Optional - Billing Will Still Be Based On Selected Body Map Location If Applicable): right forearm

## 2024-05-23 VITALS
RESPIRATION RATE: 20 BRPM | SYSTOLIC BLOOD PRESSURE: 102 MMHG | DIASTOLIC BLOOD PRESSURE: 67 MMHG | OXYGEN SATURATION: 99 % | HEART RATE: 71 BPM | TEMPERATURE: 98 F

## 2024-05-23 LAB — TROPONIN T, HIGH SENSITIVITY RESULT: 7 NG/L — SIGNIFICANT CHANGE UP (ref 0–51)

## 2024-05-23 PROCEDURE — 83880 ASSAY OF NATRIURETIC PEPTIDE: CPT

## 2024-05-23 PROCEDURE — 36415 COLL VENOUS BLD VENIPUNCTURE: CPT

## 2024-05-23 PROCEDURE — 84295 ASSAY OF SERUM SODIUM: CPT

## 2024-05-23 PROCEDURE — 85018 HEMOGLOBIN: CPT

## 2024-05-23 PROCEDURE — 71275 CT ANGIOGRAPHY CHEST: CPT | Mod: MC

## 2024-05-23 PROCEDURE — 84484 ASSAY OF TROPONIN QUANT: CPT

## 2024-05-23 PROCEDURE — 83605 ASSAY OF LACTIC ACID: CPT

## 2024-05-23 PROCEDURE — 82330 ASSAY OF CALCIUM: CPT

## 2024-05-23 PROCEDURE — 85025 COMPLETE CBC W/AUTO DIFF WBC: CPT

## 2024-05-23 PROCEDURE — 85014 HEMATOCRIT: CPT

## 2024-05-23 PROCEDURE — 83735 ASSAY OF MAGNESIUM: CPT

## 2024-05-23 PROCEDURE — 80053 COMPREHEN METABOLIC PANEL: CPT

## 2024-05-23 PROCEDURE — 82803 BLOOD GASES ANY COMBINATION: CPT

## 2024-05-23 PROCEDURE — 71275 CT ANGIOGRAPHY CHEST: CPT | Mod: 26,MC

## 2024-05-23 PROCEDURE — 99285 EMERGENCY DEPT VISIT HI MDM: CPT | Mod: 25

## 2024-05-23 PROCEDURE — 84132 ASSAY OF SERUM POTASSIUM: CPT

## 2024-05-23 PROCEDURE — 82435 ASSAY OF BLOOD CHLORIDE: CPT

## 2024-05-23 PROCEDURE — 82947 ASSAY GLUCOSE BLOOD QUANT: CPT

## 2024-05-23 PROCEDURE — 85610 PROTHROMBIN TIME: CPT

## 2024-05-23 PROCEDURE — 71045 X-RAY EXAM CHEST 1 VIEW: CPT

## 2024-05-23 PROCEDURE — 96374 THER/PROPH/DIAG INJ IV PUSH: CPT

## 2024-05-23 PROCEDURE — 83690 ASSAY OF LIPASE: CPT

## 2024-05-23 PROCEDURE — 81001 URINALYSIS AUTO W/SCOPE: CPT

## 2024-05-23 PROCEDURE — 93005 ELECTROCARDIOGRAM TRACING: CPT

## 2024-05-23 RX ORDER — METOPROLOL TARTRATE 50 MG
25 TABLET ORAL ONCE
Refills: 0 | Status: DISCONTINUED | OUTPATIENT
Start: 2024-05-23 | End: 2024-05-23

## 2024-05-23 RX ADMIN — Medication 15 MILLIGRAM(S): at 02:04

## 2024-05-23 RX ADMIN — LIDOCAINE 10 MILLILITER(S): 4 CREAM TOPICAL at 02:04

## 2024-05-23 RX ADMIN — Medication 15 MILLIGRAM(S): at 03:00

## 2024-05-23 RX ADMIN — Medication 30 MILLILITER(S): at 02:05

## 2024-05-23 NOTE — ED PROVIDER NOTE - CLINICAL SUMMARY MEDICAL DECISION MAKING FREE TEXT BOX
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 Patient denies any additional symptoms. 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 Patient denies any additional symptoms.    Patient resting comfortably. Results discussed. She believes the effusion is old. She will f/u with her cardiologist

## 2024-05-23 NOTE — ED PROVIDER NOTE - OBJECTIVE STATEMENT
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 Patient denies any additional symptoms.

## 2024-05-23 NOTE — ED PROVIDER NOTE - PATIENT PORTAL LINK FT
You can access the FollowMyHealth Patient Portal offered by [facility] by registering at the following website: [email or web address]. By joining [employer] [facility] [facility] [employer]’s FollowMyHealth portal, you will also be able to view your health information using other applications (apps) compatible with our system.

## 2024-05-23 NOTE — ED ADULT NURSE NOTE - NSFALLUNIVINTERV_ED_ALL_ED
Bed/Stretcher in lowest position, wheels locked, appropriate side rails in place/Call bell, personal items and telephone in reach/Instruct patient to call for assistance before getting out of bed/chair/stretcher/Non-slip footwear applied when patient is off stretcher/[place] to call system/Physically safe environment - no spills, clutter or unnecessary equipment/Purposeful proactive rounding/Room/bathroom lighting operational, light cord in reach

## 2024-05-23 NOTE — ED ADULT NURSE NOTE - OBJECTIVE STATEMENT
Pt is A&Ox4. Respirations even and unlabored. Pt presents to ED for recurrent episodes chest pain starting about 2 hours PTA.  Pt states she recently had bladder procedure with subsequent PE on Eliquis. Pt NSR on cardiac monitor. Denies SOB, N/V/D, dysuria.